# Patient Record
Sex: MALE | Race: WHITE | NOT HISPANIC OR LATINO
[De-identification: names, ages, dates, MRNs, and addresses within clinical notes are randomized per-mention and may not be internally consistent; named-entity substitution may affect disease eponyms.]

---

## 2017-03-15 ENCOUNTER — APPOINTMENT (OUTPATIENT)
Dept: OTOLARYNGOLOGY | Facility: CLINIC | Age: 62
End: 2017-03-15

## 2017-03-17 PROBLEM — Z00.00 ENCOUNTER FOR PREVENTIVE HEALTH EXAMINATION: Status: ACTIVE | Noted: 2017-03-17

## 2017-03-19 ENCOUNTER — TRANSCRIPTION ENCOUNTER (OUTPATIENT)
Age: 62
End: 2017-03-19

## 2017-04-11 ENCOUNTER — OUTPATIENT (OUTPATIENT)
Dept: OUTPATIENT SERVICES | Facility: HOSPITAL | Age: 62
LOS: 1 days | Discharge: HOME | End: 2017-04-11

## 2017-06-16 ENCOUNTER — OUTPATIENT (OUTPATIENT)
Dept: OUTPATIENT SERVICES | Facility: HOSPITAL | Age: 62
LOS: 1 days | Discharge: HOME | End: 2017-06-16

## 2017-06-16 DIAGNOSIS — I10 ESSENTIAL (PRIMARY) HYPERTENSION: ICD-10-CM

## 2017-06-16 DIAGNOSIS — E78.5 HYPERLIPIDEMIA, UNSPECIFIED: ICD-10-CM

## 2017-06-16 DIAGNOSIS — R04.2 HEMOPTYSIS: ICD-10-CM

## 2017-06-28 DIAGNOSIS — I77.810 THORACIC AORTIC ECTASIA: ICD-10-CM

## 2017-07-25 DIAGNOSIS — M54.2 CERVICALGIA: ICD-10-CM

## 2018-07-24 ENCOUNTER — OUTPATIENT (OUTPATIENT)
Dept: OUTPATIENT SERVICES | Facility: HOSPITAL | Age: 63
LOS: 1 days | Discharge: HOME | End: 2018-07-24

## 2018-07-24 DIAGNOSIS — R91.8 OTHER NONSPECIFIC ABNORMAL FINDING OF LUNG FIELD: ICD-10-CM

## 2018-09-26 ENCOUNTER — OUTPATIENT (OUTPATIENT)
Dept: OUTPATIENT SERVICES | Facility: HOSPITAL | Age: 63
LOS: 1 days | Discharge: HOME | End: 2018-09-26

## 2018-09-26 DIAGNOSIS — E78.1 PURE HYPERGLYCERIDEMIA: ICD-10-CM

## 2018-11-29 ENCOUNTER — APPOINTMENT (OUTPATIENT)
Dept: UROLOGY | Facility: CLINIC | Age: 63
End: 2018-11-29
Payer: COMMERCIAL

## 2018-11-29 ENCOUNTER — OUTPATIENT (OUTPATIENT)
Dept: OUTPATIENT SERVICES | Facility: HOSPITAL | Age: 63
LOS: 1 days | Discharge: HOME | End: 2018-11-29

## 2018-11-29 VITALS
SYSTOLIC BLOOD PRESSURE: 124 MMHG | HEIGHT: 71 IN | HEART RATE: 68 BPM | DIASTOLIC BLOOD PRESSURE: 81 MMHG | BODY MASS INDEX: 28.28 KG/M2 | WEIGHT: 202 LBS

## 2018-11-29 DIAGNOSIS — Z84.89 FAMILY HISTORY OF OTHER SPECIFIED CONDITIONS: ICD-10-CM

## 2018-11-29 DIAGNOSIS — I77.819 AORTIC ECTASIA, UNSPECIFIED SITE: ICD-10-CM

## 2018-11-29 DIAGNOSIS — J45.909 UNSPECIFIED ASTHMA, UNCOMPLICATED: ICD-10-CM

## 2018-11-29 DIAGNOSIS — I10 ESSENTIAL (PRIMARY) HYPERTENSION: ICD-10-CM

## 2018-11-29 DIAGNOSIS — Z82.0 FAMILY HISTORY OF EPILEPSY AND OTHER DISEASES OF THE NERVOUS SYSTEM: ICD-10-CM

## 2018-11-29 DIAGNOSIS — Z80.42 FAMILY HISTORY OF MALIGNANT NEOPLASM OF PROSTATE: ICD-10-CM

## 2018-11-29 PROCEDURE — 99204 OFFICE O/P NEW MOD 45 MIN: CPT

## 2018-11-29 RX ORDER — ALBUTEROL SULFATE 90 UG/1
108 (90 BASE) AEROSOL, METERED RESPIRATORY (INHALATION)
Refills: 0 | Status: ACTIVE | COMMUNITY

## 2018-11-29 RX ORDER — GEMFIBROZIL 600 MG/1
600 TABLET, FILM COATED ORAL
Refills: 0 | Status: ACTIVE | COMMUNITY

## 2018-11-29 RX ORDER — FLUTICASONE PROPIONATE AND SALMETEROL 50; 250 UG/1; UG/1
250-50 POWDER RESPIRATORY (INHALATION)
Refills: 0 | Status: ACTIVE | COMMUNITY

## 2018-11-30 DIAGNOSIS — N40.1 BENIGN PROSTATIC HYPERPLASIA WITH LOWER URINARY TRACT SYMPTOMS: ICD-10-CM

## 2018-12-11 ENCOUNTER — OUTPATIENT (OUTPATIENT)
Dept: OUTPATIENT SERVICES | Facility: HOSPITAL | Age: 63
LOS: 1 days | Discharge: HOME | End: 2018-12-11

## 2018-12-11 LAB — BACTERIA UR CULT: NORMAL

## 2018-12-12 DIAGNOSIS — Z13.820 ENCOUNTER FOR SCREENING FOR OSTEOPOROSIS: ICD-10-CM

## 2018-12-12 DIAGNOSIS — Z87.310 PERSONAL HISTORY OF (HEALED) OSTEOPOROSIS FRACTURE: ICD-10-CM

## 2018-12-12 DIAGNOSIS — E21.0 PRIMARY HYPERPARATHYROIDISM: ICD-10-CM

## 2019-02-11 ENCOUNTER — LABORATORY RESULT (OUTPATIENT)
Age: 64
End: 2019-02-11

## 2019-02-11 ENCOUNTER — OUTPATIENT (OUTPATIENT)
Dept: OUTPATIENT SERVICES | Facility: HOSPITAL | Age: 64
LOS: 1 days | Discharge: HOME | End: 2019-02-11

## 2019-02-11 DIAGNOSIS — N40.1 BENIGN PROSTATIC HYPERPLASIA WITH LOWER URINARY TRACT SYMPTOMS: ICD-10-CM

## 2019-02-19 ENCOUNTER — LABORATORY RESULT (OUTPATIENT)
Age: 64
End: 2019-02-19

## 2019-02-19 ENCOUNTER — APPOINTMENT (OUTPATIENT)
Dept: UROLOGY | Facility: CLINIC | Age: 64
End: 2019-02-19
Payer: COMMERCIAL

## 2019-02-19 PROCEDURE — 76872 US TRANSRECTAL: CPT

## 2019-02-19 PROCEDURE — 55700: CPT

## 2019-02-20 ENCOUNTER — OUTPATIENT (OUTPATIENT)
Dept: OUTPATIENT SERVICES | Facility: HOSPITAL | Age: 64
LOS: 1 days | Discharge: HOME | End: 2019-02-20

## 2019-02-21 DIAGNOSIS — R89.7 ABNORMAL HISTOLOGICAL FINDINGS IN SPECIMENS FROM OTHER ORGANS, SYSTEMS AND TISSUES: ICD-10-CM

## 2019-02-27 ENCOUNTER — TRANSCRIPTION ENCOUNTER (OUTPATIENT)
Age: 64
End: 2019-02-27

## 2019-03-08 ENCOUNTER — APPOINTMENT (OUTPATIENT)
Dept: UROLOGY | Facility: CLINIC | Age: 64
End: 2019-03-08
Payer: COMMERCIAL

## 2019-03-08 VITALS
HEIGHT: 71 IN | SYSTOLIC BLOOD PRESSURE: 124 MMHG | HEART RATE: 57 BPM | BODY MASS INDEX: 28.28 KG/M2 | DIASTOLIC BLOOD PRESSURE: 72 MMHG | WEIGHT: 202 LBS

## 2019-03-08 PROCEDURE — 99214 OFFICE O/P EST MOD 30 MIN: CPT

## 2019-03-08 NOTE — HISTORY OF PRESENT ILLNESS
[Urinary Frequency] : urinary frequency [Nocturia] : nocturia [None] : None [FreeTextEntry1] : 63-year-old male he has followup post TRUSBx on February 19, 2019. He denies any postbiopsy febrile events but does report visual blood in his semen last week which has since cleared. His voiding symptoms remain unchanged indicating frequency nocturia x3-4 with a fair urinary stream.\par Of clinical significance is his history of a thoracic aortic dilation approximating 4.4 cm, actively being followed by the pulmonary team.\par Of clinical importance is history of hypercalcemia--actively being followed by  endocrine team.\par Pathology : LLM 4+3 / group 2  /50%\par                  : LLA 4+3  /grioup 2/  70%\par                  : LA 4+5  / group 5 / 90 %\par                  : LM  3+3 / group 1 / 10 %\par 2018 psa =6.3  % free - 12.0 [Weak Stream] : no weak stream [Dysuria] : no dysuria [Hematuria - Gross] : no gross hematuria [Fatigue] : no fatigue

## 2019-03-08 NOTE — PHYSICAL EXAM
[General Appearance - Well Developed] : well developed [General Appearance - Well Nourished] : well nourished [Normal Appearance] : normal appearance [Well Groomed] : well groomed [General Appearance - In No Acute Distress] : no acute distress [Abdomen Soft] : soft [Abdomen Tenderness] : non-tender [Costovertebral Angle Tenderness] : no ~M costovertebral angle tenderness [Urethral Meatus] : meatus normal [Skin Color & Pigmentation] : normal skin color and pigmentation [Edema] : no peripheral edema [] : no respiratory distress [Respiration, Rhythm And Depth] : normal respiratory rhythm and effort [Exaggerated Use Of Accessory Muscles For Inspiration] : no accessory muscle use [Oriented To Time, Place, And Person] : oriented to person, place, and time [Affect] : the affect was normal [Mood] : the mood was normal [Not Anxious] : not anxious [Normal Station and Gait] : the gait and station were normal for the patient's age [No Focal Deficits] : no focal deficits [No Palpable Adenopathy] : no palpable adenopathy [FreeTextEntry1] : global

## 2019-03-08 NOTE — ASSESSMENT
[FreeTextEntry1] : #1. Prostate carcinoma, stage pT2a, group 5 , high risk stratification\par #2. Lower urinary tract symptoms--probably associated with prostate enlargement\par \par Plan\par CT scan of abdomen and pelvis\par Bone scan\par Chest CTs have been recently performed by pulmonary for thoracic aorta evaluation.\par We discussed treatment options such as Robotic radical prostatectomy and radiation therapy. Patient voices his desire for surgical extirpation. Therefore we discussed the procedure, risks, benefits, expectations,, postop course, of a Bard prostatectomy. Video disc given\par Patient to have consultation with Dr. Meyer\par No clear indication for Oncotype  GPS\par

## 2019-03-13 ENCOUNTER — OTHER (OUTPATIENT)
Age: 64
End: 2019-03-13

## 2019-03-15 ENCOUNTER — OUTPATIENT (OUTPATIENT)
Dept: OUTPATIENT SERVICES | Facility: HOSPITAL | Age: 64
LOS: 1 days | Discharge: HOME | End: 2019-03-15

## 2019-03-15 ENCOUNTER — MESSAGE (OUTPATIENT)
Age: 64
End: 2019-03-15

## 2019-03-15 ENCOUNTER — OTHER (OUTPATIENT)
Age: 64
End: 2019-03-15

## 2019-03-15 ENCOUNTER — FORM ENCOUNTER (OUTPATIENT)
Age: 64
End: 2019-03-15

## 2019-03-15 DIAGNOSIS — C61 MALIGNANT NEOPLASM OF PROSTATE: ICD-10-CM

## 2019-03-16 ENCOUNTER — OUTPATIENT (OUTPATIENT)
Dept: OUTPATIENT SERVICES | Facility: HOSPITAL | Age: 64
LOS: 1 days | Discharge: HOME | End: 2019-03-16

## 2019-03-16 DIAGNOSIS — C61 MALIGNANT NEOPLASM OF PROSTATE: ICD-10-CM

## 2019-03-19 ENCOUNTER — OUTPATIENT (OUTPATIENT)
Dept: OUTPATIENT SERVICES | Facility: HOSPITAL | Age: 64
LOS: 1 days | Discharge: HOME | End: 2019-03-19

## 2019-03-19 DIAGNOSIS — C61 MALIGNANT NEOPLASM OF PROSTATE: ICD-10-CM

## 2019-03-20 LAB
BUN SERPL-MCNC: 20 MG/DL
CREAT SERPL-MCNC: 1 MG/DL

## 2019-03-21 ENCOUNTER — APPOINTMENT (OUTPATIENT)
Dept: UROLOGY | Facility: CLINIC | Age: 64
End: 2019-03-21
Payer: COMMERCIAL

## 2019-03-21 VITALS
BODY MASS INDEX: 23.37 KG/M2 | DIASTOLIC BLOOD PRESSURE: 74 MMHG | SYSTOLIC BLOOD PRESSURE: 122 MMHG | HEART RATE: 66 BPM | HEIGHT: 78 IN | WEIGHT: 202 LBS

## 2019-03-21 PROCEDURE — 99215 OFFICE O/P EST HI 40 MIN: CPT

## 2019-03-21 NOTE — PHYSICAL EXAM
[General Appearance - Well Developed] : well developed [General Appearance - Well Nourished] : well nourished [Normal Appearance] : normal appearance [Well Groomed] : well groomed [General Appearance - In No Acute Distress] : no acute distress [Edema] : no peripheral edema [Respiration, Rhythm And Depth] : normal respiratory rhythm and effort [Exaggerated Use Of Accessory Muscles For Inspiration] : no accessory muscle use [Abdomen Soft] : soft [Abdomen Tenderness] : non-tender [Costovertebral Angle Tenderness] : no ~M costovertebral angle tenderness [FreeTextEntry1] : thin, no scars [Urinary Bladder Findings] : the bladder was normal on palpation [Testes Mass (___cm)] : there were no testicular masses [Normal Station and Gait] : the gait and station were normal for the patient's age [] : no rash [No Focal Deficits] : no focal deficits [Oriented To Time, Place, And Person] : oriented to person, place, and time [Affect] : the affect was normal [Mood] : the mood was normal [Not Anxious] : not anxious [No Palpable Adenopathy] : no palpable adenopathy

## 2019-03-21 NOTE — ASSESSMENT
[FreeTextEntry1] : DANILO GREGORY is a 63 year old male who presents with high risk PCa. \par We discussed the various management options and patient elects to go robotic assisted radical prostatectomy. Patient's father had radiation therapy and suffered from radiation side effects and therefore the patient would prefer to undergo surgery.\par I would like to obtain MRI for further assessment of the prostate and presurgical planning.\par We did discuss the nerve sparing a high risk prostate cancer would be significantly risky. We also discussed the likelihood for needing salvage radiation therapy. \par \par Summary of our discussion--\par The natural history of this disease was explained to the patient at length and the treatment options discussed including radical prostatectomy by open or robotic-assisted laparoscopic approach, external-beam radiotherapy, brachytherapy, and watchful waiting, including the probability of success and complications associated with these approaches.\par \par With respect to seed implants, we discussed risks including but not limited to cancer recurrence, exacerbation of voiding symptoms or hematuria, urinary retention, induction of 2nd malignancy, and risks of rectal symptoms or bleeding.  We also discussed risks of the anesthesia including but not limited to MI, CVA, DVT, and PE.  \par With respect to EBRT, we discussed we discussed risks including but not limited to cancer recurrence, exacerbation of voiding symptoms or hematuria, urinary retention, incontinence, stricture of the urinary tract, induction of 2nd malignancy, and risks of rectal symptoms or bleeding.  We discussed fact that rectal symptoms may be more common with EBRT than with other treatment modalities. I did convey that the risk of erectile dysfunction was likely lower with EBRT, at least in the short-term.\par \par With radiation therapy, we discussed the difficulties in diagnosing recurrent disease at an early stage due to variability in PSA levels and the fact that most patients are not candidates for local salvage therapy when biochemical recurrence is declared.  We also discussed the significant morbidity of local salvage therapy in terms of perioperative complications, erectile dysfunction and urinary incontinence.\par \par With respect to radical prostatectomy, the pros and cons of open vs robotic-assisted laparoscopic prostatectomy were discussed. The complications of this procedure were reviewed with the patient and include (but not limited to) urinary incontinence, erectile dysfunction, infertility, anastomotic stricture, lymphocele, hemorrhage requiring transfusion, rectal, ureteral, or nerve injury, infection, cardiovascular, pulmonary, thromboembolic, and anesthetic complications.  For robotic prostatectomy, the additional complications of anastomotic urine leak and small bowel obstruction from adhesions was conveyed. We also discussed the need for a urethral catheter as well as a MOLLY drain post-operatively.\par \par With surgery, we also discussed the potential advantage over radiation therapy in that biochemical recurrence can be detected at a relatively earlier stage and that salvage radiotherapy is successful in controlling recurrent disease in a substantial proportion of patients.  I also conveyed that salvage radiotherapy was associated with a considerably more favorable morbidity profile compared to local salvage therapies for radiorecurrent disease. \par \par With briefly discussed watchful waiting/active surveillance and the difficulties of estimating the extent of disease preoperatively, the risk of cancer progression, and risk that salvage might not be possible with progression. These approaches were not recommended given he has high risk PCa and a high life expectancy.\par \par All of the patient questions were answered.  \par \par \par

## 2019-03-21 NOTE — HISTORY OF PRESENT ILLNESS
[FreeTextEntry1] : DANILO GREGORY is a 63 year old male who presents with high risk PCa. He is a patient of Dr. Funes. Underwent TRUS PBNx 2/19/19 for PSA 6.3,  pathology demonstrating ,Gl 4+5 PCa grade group 5, involving up to 90% of core, 4 of 12 positive. \par He presents today for discussion regarding surgical options.\par Bone scan neg for mets, CT pelvis no lymphad\par WILIAM score 18\par AUA score 19 mostly unsatisfied\par From Nov 2018 however reports worsening LUTS. States that he is having nocturia 3-4 times and daytime frequency and urgency. \par Denies gross hematuria, dysuria or associated symptoms. 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

## 2019-03-22 ENCOUNTER — APPOINTMENT (OUTPATIENT)
Dept: UROLOGY | Facility: CLINIC | Age: 64
End: 2019-03-22
Payer: COMMERCIAL

## 2019-04-03 ENCOUNTER — OUTPATIENT (OUTPATIENT)
Dept: OUTPATIENT SERVICES | Facility: HOSPITAL | Age: 64
LOS: 1 days | Discharge: HOME | End: 2019-04-03
Payer: COMMERCIAL

## 2019-04-03 VITALS
RESPIRATION RATE: 18 BRPM | DIASTOLIC BLOOD PRESSURE: 74 MMHG | OXYGEN SATURATION: 96 % | TEMPERATURE: 98 F | HEIGHT: 71 IN | HEART RATE: 67 BPM | WEIGHT: 205.69 LBS | SYSTOLIC BLOOD PRESSURE: 137 MMHG

## 2019-04-03 DIAGNOSIS — C61 MALIGNANT NEOPLASM OF PROSTATE: ICD-10-CM

## 2019-04-03 DIAGNOSIS — Z98.890 OTHER SPECIFIED POSTPROCEDURAL STATES: Chronic | ICD-10-CM

## 2019-04-03 DIAGNOSIS — Z90.89 ACQUIRED ABSENCE OF OTHER ORGANS: Chronic | ICD-10-CM

## 2019-04-03 DIAGNOSIS — Z01.818 ENCOUNTER FOR OTHER PREPROCEDURAL EXAMINATION: ICD-10-CM

## 2019-04-03 LAB
ALBUMIN SERPL ELPH-MCNC: 5 G/DL — SIGNIFICANT CHANGE UP (ref 3.5–5.2)
ALP SERPL-CCNC: 105 U/L — SIGNIFICANT CHANGE UP (ref 30–115)
ALT FLD-CCNC: 20 U/L — SIGNIFICANT CHANGE UP (ref 0–41)
ANION GAP SERPL CALC-SCNC: 15 MMOL/L — HIGH (ref 7–14)
APPEARANCE UR: CLEAR — SIGNIFICANT CHANGE UP
APTT BLD: 40.5 SEC — HIGH (ref 27–39.2)
AST SERPL-CCNC: 19 U/L — SIGNIFICANT CHANGE UP (ref 0–41)
BASOPHILS # BLD AUTO: 0.08 K/UL — SIGNIFICANT CHANGE UP (ref 0–0.2)
BASOPHILS NFR BLD AUTO: 1.2 % — HIGH (ref 0–1)
BILIRUB SERPL-MCNC: 0.4 MG/DL — SIGNIFICANT CHANGE UP (ref 0.2–1.2)
BILIRUB UR-MCNC: NEGATIVE — SIGNIFICANT CHANGE UP
BLD GP AB SCN SERPL QL: SIGNIFICANT CHANGE UP
BUN SERPL-MCNC: 19 MG/DL — SIGNIFICANT CHANGE UP (ref 10–20)
CALCIUM SERPL-MCNC: 11.2 MG/DL — HIGH (ref 8.5–10.1)
CHLORIDE SERPL-SCNC: 103 MMOL/L — SIGNIFICANT CHANGE UP (ref 98–110)
CO2 SERPL-SCNC: 22 MMOL/L — SIGNIFICANT CHANGE UP (ref 17–32)
COLOR SPEC: YELLOW — SIGNIFICANT CHANGE UP
CREAT SERPL-MCNC: 0.9 MG/DL — SIGNIFICANT CHANGE UP (ref 0.7–1.5)
DIFF PNL FLD: NEGATIVE — SIGNIFICANT CHANGE UP
EOSINOPHIL # BLD AUTO: 0.21 K/UL — SIGNIFICANT CHANGE UP (ref 0–0.7)
EOSINOPHIL NFR BLD AUTO: 3.2 % — SIGNIFICANT CHANGE UP (ref 0–8)
GLUCOSE SERPL-MCNC: 121 MG/DL — HIGH (ref 70–99)
GLUCOSE UR QL: NEGATIVE MG/DL — SIGNIFICANT CHANGE UP
HCT VFR BLD CALC: 43.2 % — SIGNIFICANT CHANGE UP (ref 42–52)
HGB BLD-MCNC: 13.7 G/DL — LOW (ref 14–18)
IMM GRANULOCYTES NFR BLD AUTO: 0.8 % — HIGH (ref 0.1–0.3)
INR BLD: 0.89 RATIO — SIGNIFICANT CHANGE UP (ref 0.65–1.3)
KETONES UR-MCNC: NEGATIVE — SIGNIFICANT CHANGE UP
LEUKOCYTE ESTERASE UR-ACNC: NEGATIVE — SIGNIFICANT CHANGE UP
LYMPHOCYTES # BLD AUTO: 2 K/UL — SIGNIFICANT CHANGE UP (ref 1.2–3.4)
LYMPHOCYTES # BLD AUTO: 30 % — SIGNIFICANT CHANGE UP (ref 20.5–51.1)
MCHC RBC-ENTMCNC: 23.1 PG — LOW (ref 27–31)
MCHC RBC-ENTMCNC: 31.7 G/DL — LOW (ref 32–37)
MCV RBC AUTO: 72.7 FL — LOW (ref 80–94)
MONOCYTES # BLD AUTO: 0.46 K/UL — SIGNIFICANT CHANGE UP (ref 0.1–0.6)
MONOCYTES NFR BLD AUTO: 6.9 % — SIGNIFICANT CHANGE UP (ref 1.7–9.3)
NEUTROPHILS # BLD AUTO: 3.86 K/UL — SIGNIFICANT CHANGE UP (ref 1.4–6.5)
NEUTROPHILS NFR BLD AUTO: 57.9 % — SIGNIFICANT CHANGE UP (ref 42.2–75.2)
NITRITE UR-MCNC: NEGATIVE — SIGNIFICANT CHANGE UP
NRBC # BLD: 0 /100 WBCS — SIGNIFICANT CHANGE UP (ref 0–0)
PH UR: 6 — SIGNIFICANT CHANGE UP (ref 5–8)
PLATELET # BLD AUTO: 281 K/UL — SIGNIFICANT CHANGE UP (ref 130–400)
POTASSIUM SERPL-MCNC: 4.6 MMOL/L — SIGNIFICANT CHANGE UP (ref 3.5–5)
POTASSIUM SERPL-SCNC: 4.6 MMOL/L — SIGNIFICANT CHANGE UP (ref 3.5–5)
PROT SERPL-MCNC: 7.4 G/DL — SIGNIFICANT CHANGE UP (ref 6–8)
PROT UR-MCNC: NEGATIVE MG/DL — SIGNIFICANT CHANGE UP
PROTHROM AB SERPL-ACNC: 10.3 SEC — SIGNIFICANT CHANGE UP (ref 9.95–12.87)
RBC # BLD: 5.94 M/UL — SIGNIFICANT CHANGE UP (ref 4.7–6.1)
RBC # FLD: 14.7 % — HIGH (ref 11.5–14.5)
SODIUM SERPL-SCNC: 140 MMOL/L — SIGNIFICANT CHANGE UP (ref 135–146)
SP GR SPEC: 1.02 — SIGNIFICANT CHANGE UP (ref 1.01–1.03)
TYPE + AB SCN PNL BLD: SIGNIFICANT CHANGE UP
UROBILINOGEN FLD QL: 0.2 MG/DL — SIGNIFICANT CHANGE UP (ref 0.2–0.2)
WBC # BLD: 6.66 K/UL — SIGNIFICANT CHANGE UP (ref 4.8–10.8)
WBC # FLD AUTO: 6.66 K/UL — SIGNIFICANT CHANGE UP (ref 4.8–10.8)

## 2019-04-03 PROCEDURE — 71046 X-RAY EXAM CHEST 2 VIEWS: CPT | Mod: 26

## 2019-04-03 PROCEDURE — 93010 ELECTROCARDIOGRAM REPORT: CPT

## 2019-04-03 NOTE — H&P PST ADULT - NSICDXPASTSURGICALHX_GEN_ALL_CORE_FT
PAST SURGICAL HISTORY:  History of surgery right    History of tonsillectomy PAST SURGICAL HISTORY:  History of surgery right tibia/fibula    History of tonsillectomy

## 2019-04-03 NOTE — H&P PST ADULT - REASON FOR ADMISSION
65 Y/O M SCHEDULED FOR PAST FOR ROBOTIC RADICAL PROSTATECTOMY AND PELVIC LYMPH NODE DISSECTION ON 4/17/19

## 2019-04-03 NOTE — H&P PST ADULT - PRIMARY CARE PROVIDER
strange- lv last week   manniatis- pulm lv 2018     lachey-2018 (dilated aorta) strange- lv last week   manniatis- pulm lv 2018     lachey-2018 (dilated aorta)   anyi-endocrine (elevated calcium)     mostafavi- optho annual visits

## 2019-04-03 NOTE — H&P PST ADULT - BREASTS
not examined Consent (Lip)/Introductory Paragraph: The rationale for Mohs was explained to the patient and consent was obtained. The risks, benefits and alternatives to therapy were discussed in detail. Specifically, the risks of lip deformity, changes in the oral aperture, infection, scarring, bleeding, prolonged wound healing, incomplete removal, allergy to anesthesia, nerve injury and recurrence were addressed. Prior to the procedure, the treatment site was clearly identified and confirmed by the patient. All components of Universal Protocol/PAUSE Rule completed.

## 2019-04-03 NOTE — H&P PST ADULT - NSICDXPASTMEDICALHX_GEN_ALL_CORE_FT
PAST MEDICAL HISTORY:  Asthma last attack 3-4 years ago  last used ventolin 3 weeks ago    Back pain     GERD (gastroesophageal reflux disease)     H/O detached retina repair left eye    High cholesterol     HTN (hypertension)     Prostate cancer     Serum calcium elevated

## 2019-04-05 LAB
CULTURE RESULTS: NO GROWTH — SIGNIFICANT CHANGE UP
SPECIMEN SOURCE: SIGNIFICANT CHANGE UP

## 2019-04-09 ENCOUNTER — OTHER (OUTPATIENT)
Age: 64
End: 2019-04-09

## 2019-04-10 ENCOUNTER — FORM ENCOUNTER (OUTPATIENT)
Age: 64
End: 2019-04-10

## 2019-04-10 PROBLEM — Z98.890 OTHER SPECIFIED POSTPROCEDURAL STATES: Chronic | Status: ACTIVE | Noted: 2019-04-03

## 2019-04-10 PROBLEM — I10 ESSENTIAL (PRIMARY) HYPERTENSION: Chronic | Status: ACTIVE | Noted: 2019-04-03

## 2019-04-10 PROBLEM — E78.00 PURE HYPERCHOLESTEROLEMIA, UNSPECIFIED: Chronic | Status: ACTIVE | Noted: 2019-04-03

## 2019-04-10 PROBLEM — C61 MALIGNANT NEOPLASM OF PROSTATE: Chronic | Status: ACTIVE | Noted: 2019-04-03

## 2019-04-10 PROBLEM — E83.52 HYPERCALCEMIA: Chronic | Status: ACTIVE | Noted: 2019-04-03

## 2019-04-10 PROBLEM — M54.9 DORSALGIA, UNSPECIFIED: Chronic | Status: ACTIVE | Noted: 2019-04-03

## 2019-04-10 PROBLEM — K21.9 GASTRO-ESOPHAGEAL REFLUX DISEASE WITHOUT ESOPHAGITIS: Chronic | Status: ACTIVE | Noted: 2019-04-03

## 2019-04-11 ENCOUNTER — OUTPATIENT (OUTPATIENT)
Dept: OUTPATIENT SERVICES | Facility: HOSPITAL | Age: 64
LOS: 1 days | Discharge: HOME | End: 2019-04-11
Payer: COMMERCIAL

## 2019-04-11 DIAGNOSIS — Z90.89 ACQUIRED ABSENCE OF OTHER ORGANS: Chronic | ICD-10-CM

## 2019-04-11 DIAGNOSIS — Z98.890 OTHER SPECIFIED POSTPROCEDURAL STATES: Chronic | ICD-10-CM

## 2019-04-11 DIAGNOSIS — C61 MALIGNANT NEOPLASM OF PROSTATE: ICD-10-CM

## 2019-04-11 PROCEDURE — 72197 MRI PELVIS W/O & W/DYE: CPT | Mod: 26

## 2019-04-16 DIAGNOSIS — I10 ESSENTIAL (PRIMARY) HYPERTENSION: ICD-10-CM

## 2019-04-16 DIAGNOSIS — K21.9 GASTRO-ESOPHAGEAL REFLUX DISEASE WITHOUT ESOPHAGITIS: ICD-10-CM

## 2019-04-16 DIAGNOSIS — J45.909 UNSPECIFIED ASTHMA, UNCOMPLICATED: ICD-10-CM

## 2019-04-17 ENCOUNTER — APPOINTMENT (OUTPATIENT)
Dept: UROLOGY | Facility: HOSPITAL | Age: 64
End: 2019-04-17
Payer: COMMERCIAL

## 2019-04-17 ENCOUNTER — INPATIENT (INPATIENT)
Facility: HOSPITAL | Age: 64
LOS: 1 days | Discharge: HOME | End: 2019-04-19
Attending: UROLOGY | Admitting: UROLOGY
Payer: COMMERCIAL

## 2019-04-17 ENCOUNTER — RESULT REVIEW (OUTPATIENT)
Age: 64
End: 2019-04-17

## 2019-04-17 VITALS
TEMPERATURE: 98 F | HEIGHT: 71 IN | SYSTOLIC BLOOD PRESSURE: 137 MMHG | HEART RATE: 58 BPM | RESPIRATION RATE: 20 BRPM | WEIGHT: 201.94 LBS | DIASTOLIC BLOOD PRESSURE: 90 MMHG

## 2019-04-17 DIAGNOSIS — Z98.890 OTHER SPECIFIED POSTPROCEDURAL STATES: Chronic | ICD-10-CM

## 2019-04-17 DIAGNOSIS — Z90.89 ACQUIRED ABSENCE OF OTHER ORGANS: Chronic | ICD-10-CM

## 2019-04-17 DIAGNOSIS — C61 MALIGNANT NEOPLASM OF PROSTATE: ICD-10-CM

## 2019-04-17 LAB
ABO RH CONFIRMATION: SIGNIFICANT CHANGE UP
ANION GAP SERPL CALC-SCNC: 13 MMOL/L — SIGNIFICANT CHANGE UP (ref 7–14)
ANISOCYTOSIS BLD QL: SLIGHT — SIGNIFICANT CHANGE UP
BASOPHILS # BLD AUTO: 0 K/UL — SIGNIFICANT CHANGE UP (ref 0–0.2)
BASOPHILS NFR BLD AUTO: 0 % — SIGNIFICANT CHANGE UP (ref 0–1)
BUN SERPL-MCNC: 20 MG/DL — SIGNIFICANT CHANGE UP (ref 10–20)
CALCIUM SERPL-MCNC: 9.8 MG/DL — SIGNIFICANT CHANGE UP (ref 8.5–10.1)
CHLORIDE SERPL-SCNC: 104 MMOL/L — SIGNIFICANT CHANGE UP (ref 98–110)
CO2 SERPL-SCNC: 22 MMOL/L — SIGNIFICANT CHANGE UP (ref 17–32)
CREAT SERPL-MCNC: 1.2 MG/DL — SIGNIFICANT CHANGE UP (ref 0.7–1.5)
EOSINOPHIL # BLD AUTO: 0 K/UL — SIGNIFICANT CHANGE UP (ref 0–0.7)
EOSINOPHIL NFR BLD AUTO: 0 % — SIGNIFICANT CHANGE UP (ref 0–8)
GIANT PLATELETS BLD QL SMEAR: PRESENT — SIGNIFICANT CHANGE UP
GLUCOSE SERPL-MCNC: 142 MG/DL — HIGH (ref 70–99)
HCT VFR BLD CALC: 37.9 % — LOW (ref 42–52)
HGB BLD-MCNC: 12.1 G/DL — LOW (ref 14–18)
LYMPHOCYTES # BLD AUTO: 0 % — LOW (ref 20.5–51.1)
LYMPHOCYTES # BLD AUTO: 0 K/UL — LOW (ref 1.2–3.4)
MANUAL SMEAR VERIFICATION: SIGNIFICANT CHANGE UP
MCHC RBC-ENTMCNC: 23 PG — LOW (ref 27–31)
MCHC RBC-ENTMCNC: 31.9 G/DL — LOW (ref 32–37)
MCV RBC AUTO: 72.1 FL — LOW (ref 80–94)
MICROCYTES BLD QL: SLIGHT — SIGNIFICANT CHANGE UP
MONOCYTES # BLD AUTO: 0.36 K/UL — SIGNIFICANT CHANGE UP (ref 0.1–0.6)
MONOCYTES NFR BLD AUTO: 1.8 % — SIGNIFICANT CHANGE UP (ref 1.7–9.3)
NEUTROPHILS # BLD AUTO: 19.32 K/UL — HIGH (ref 1.4–6.5)
NEUTROPHILS NFR BLD AUTO: 96.5 % — HIGH (ref 42.2–75.2)
PLAT MORPH BLD: NORMAL — SIGNIFICANT CHANGE UP
PLATELET # BLD AUTO: 280 K/UL — SIGNIFICANT CHANGE UP (ref 130–400)
POTASSIUM SERPL-MCNC: 4.4 MMOL/L — SIGNIFICANT CHANGE UP (ref 3.5–5)
POTASSIUM SERPL-SCNC: 4.4 MMOL/L — SIGNIFICANT CHANGE UP (ref 3.5–5)
RBC # BLD: 5.26 M/UL — SIGNIFICANT CHANGE UP (ref 4.7–6.1)
RBC # FLD: 14.3 % — SIGNIFICANT CHANGE UP (ref 11.5–14.5)
RBC BLD AUTO: NORMAL — SIGNIFICANT CHANGE UP
SODIUM SERPL-SCNC: 139 MMOL/L — SIGNIFICANT CHANGE UP (ref 135–146)
VARIANT LYMPHS # BLD: 1.7 % — SIGNIFICANT CHANGE UP (ref 0–5)
WBC # BLD: 20.02 K/UL — HIGH (ref 4.8–10.8)
WBC # FLD AUTO: 20.02 K/UL — HIGH (ref 4.8–10.8)

## 2019-04-17 PROCEDURE — 88307 TISSUE EXAM BY PATHOLOGIST: CPT | Mod: 26

## 2019-04-17 PROCEDURE — S2900 ROBOTIC SURGICAL SYSTEM: CPT | Mod: NC

## 2019-04-17 PROCEDURE — 93010 ELECTROCARDIOGRAM REPORT: CPT

## 2019-04-17 PROCEDURE — 38571 LAPAROSCOPY LYMPHADENECTOMY: CPT

## 2019-04-17 PROCEDURE — 88309 TISSUE EXAM BY PATHOLOGIST: CPT | Mod: 26

## 2019-04-17 PROCEDURE — 55866 LAPS SURG PRST8ECT RPBIC RAD: CPT

## 2019-04-17 RX ORDER — MEPERIDINE HYDROCHLORIDE 50 MG/ML
12.5 INJECTION INTRAMUSCULAR; INTRAVENOUS; SUBCUTANEOUS
Qty: 0 | Refills: 0 | Status: DISCONTINUED | OUTPATIENT
Start: 2019-04-17 | End: 2019-04-17

## 2019-04-17 RX ORDER — OXYCODONE AND ACETAMINOPHEN 5; 325 MG/1; MG/1
1 TABLET ORAL ONCE
Qty: 0 | Refills: 0 | Status: DISCONTINUED | OUTPATIENT
Start: 2019-04-17 | End: 2019-04-17

## 2019-04-17 RX ORDER — ACETAMINOPHEN 500 MG
1000 TABLET ORAL EVERY 6 HOURS
Qty: 0 | Refills: 0 | Status: DISCONTINUED | OUTPATIENT
Start: 2019-04-17 | End: 2019-04-19

## 2019-04-17 RX ORDER — CEFAZOLIN SODIUM 1 G
1000 VIAL (EA) INJECTION EVERY 8 HOURS
Qty: 0 | Refills: 0 | Status: COMPLETED | OUTPATIENT
Start: 2019-04-17 | End: 2019-04-18

## 2019-04-17 RX ORDER — DOCUSATE SODIUM 100 MG
100 CAPSULE ORAL THREE TIMES A DAY
Qty: 0 | Refills: 0 | Status: DISCONTINUED | OUTPATIENT
Start: 2019-04-17 | End: 2019-04-19

## 2019-04-17 RX ORDER — SODIUM CHLORIDE 9 MG/ML
1000 INJECTION, SOLUTION INTRAVENOUS
Qty: 0 | Refills: 0 | Status: DISCONTINUED | OUTPATIENT
Start: 2019-04-17 | End: 2019-04-17

## 2019-04-17 RX ORDER — KETOROLAC TROMETHAMINE 30 MG/ML
15 SYRINGE (ML) INJECTION EVERY 6 HOURS
Qty: 0 | Refills: 0 | Status: DISCONTINUED | OUTPATIENT
Start: 2019-04-17 | End: 2019-04-19

## 2019-04-17 RX ORDER — METOCLOPRAMIDE HCL 10 MG
10 TABLET ORAL ONCE
Qty: 0 | Refills: 0 | Status: DISCONTINUED | OUTPATIENT
Start: 2019-04-17 | End: 2019-04-17

## 2019-04-17 RX ORDER — ONDANSETRON 8 MG/1
4 TABLET, FILM COATED ORAL EVERY 6 HOURS
Qty: 0 | Refills: 0 | Status: DISCONTINUED | OUTPATIENT
Start: 2019-04-17 | End: 2019-04-19

## 2019-04-17 RX ORDER — OXYCODONE HYDROCHLORIDE 5 MG/1
5 TABLET ORAL EVERY 6 HOURS
Qty: 0 | Refills: 0 | Status: DISCONTINUED | OUTPATIENT
Start: 2019-04-17 | End: 2019-04-19

## 2019-04-17 RX ORDER — GEMFIBROZIL 600 MG
600 TABLET ORAL
Qty: 0 | Refills: 0 | Status: DISCONTINUED | OUTPATIENT
Start: 2019-04-17 | End: 2019-04-19

## 2019-04-17 RX ORDER — MORPHINE SULFATE 50 MG/1
2 CAPSULE, EXTENDED RELEASE ORAL
Qty: 0 | Refills: 0 | Status: DISCONTINUED | OUTPATIENT
Start: 2019-04-17 | End: 2019-04-17

## 2019-04-17 RX ORDER — ONDANSETRON 8 MG/1
4 TABLET, FILM COATED ORAL ONCE
Qty: 0 | Refills: 0 | Status: DISCONTINUED | OUTPATIENT
Start: 2019-04-17 | End: 2019-04-17

## 2019-04-17 RX ORDER — HEPARIN SODIUM 5000 [USP'U]/ML
5000 INJECTION INTRAVENOUS; SUBCUTANEOUS EVERY 12 HOURS
Qty: 0 | Refills: 0 | Status: DISCONTINUED | OUTPATIENT
Start: 2019-04-17 | End: 2019-04-19

## 2019-04-17 RX ORDER — SODIUM CHLORIDE 9 MG/ML
1000 INJECTION INTRAMUSCULAR; INTRAVENOUS; SUBCUTANEOUS
Qty: 0 | Refills: 0 | Status: DISCONTINUED | OUTPATIENT
Start: 2019-04-17 | End: 2019-04-18

## 2019-04-17 RX ORDER — SENNA PLUS 8.6 MG/1
2 TABLET ORAL AT BEDTIME
Qty: 0 | Refills: 0 | Status: DISCONTINUED | OUTPATIENT
Start: 2019-04-17 | End: 2019-04-19

## 2019-04-17 RX ORDER — LISINOPRIL 2.5 MG/1
20 TABLET ORAL DAILY
Qty: 0 | Refills: 0 | Status: DISCONTINUED | OUTPATIENT
Start: 2019-04-17 | End: 2019-04-19

## 2019-04-17 RX ORDER — HYDROMORPHONE HYDROCHLORIDE 2 MG/ML
0.5 INJECTION INTRAMUSCULAR; INTRAVENOUS; SUBCUTANEOUS
Qty: 0 | Refills: 0 | Status: DISCONTINUED | OUTPATIENT
Start: 2019-04-17 | End: 2019-04-17

## 2019-04-17 RX ORDER — PANTOPRAZOLE SODIUM 20 MG/1
40 TABLET, DELAYED RELEASE ORAL
Qty: 0 | Refills: 0 | Status: DISCONTINUED | OUTPATIENT
Start: 2019-04-17 | End: 2019-04-19

## 2019-04-17 RX ADMIN — Medication 600 MILLIGRAM(S): at 18:20

## 2019-04-17 RX ADMIN — SENNA PLUS 2 TABLET(S): 8.6 TABLET ORAL at 21:52

## 2019-04-17 RX ADMIN — Medication 15 MILLIGRAM(S): at 20:27

## 2019-04-17 RX ADMIN — Medication 1000 MILLIGRAM(S): at 18:29

## 2019-04-17 RX ADMIN — HYDROMORPHONE HYDROCHLORIDE 0.5 MILLIGRAM(S): 2 INJECTION INTRAMUSCULAR; INTRAVENOUS; SUBCUTANEOUS at 14:15

## 2019-04-17 RX ADMIN — HYDROMORPHONE HYDROCHLORIDE 0.5 MILLIGRAM(S): 2 INJECTION INTRAMUSCULAR; INTRAVENOUS; SUBCUTANEOUS at 14:05

## 2019-04-17 RX ADMIN — Medication 100 MILLIGRAM(S): at 21:48

## 2019-04-17 RX ADMIN — HYDROMORPHONE HYDROCHLORIDE 0.5 MILLIGRAM(S): 2 INJECTION INTRAMUSCULAR; INTRAVENOUS; SUBCUTANEOUS at 15:37

## 2019-04-17 RX ADMIN — SODIUM CHLORIDE 125 MILLILITER(S): 9 INJECTION INTRAMUSCULAR; INTRAVENOUS; SUBCUTANEOUS at 13:32

## 2019-04-17 RX ADMIN — HYDROMORPHONE HYDROCHLORIDE 0.5 MILLIGRAM(S): 2 INJECTION INTRAMUSCULAR; INTRAVENOUS; SUBCUTANEOUS at 13:50

## 2019-04-17 RX ADMIN — Medication 15 MILLIGRAM(S): at 20:24

## 2019-04-17 RX ADMIN — HYDROMORPHONE HYDROCHLORIDE 0.5 MILLIGRAM(S): 2 INJECTION INTRAMUSCULAR; INTRAVENOUS; SUBCUTANEOUS at 14:35

## 2019-04-17 NOTE — BRIEF OPERATIVE NOTE - NSICDXBRIEFPROCEDURE_GEN_ALL_CORE_FT
PROCEDURES:  Robotic-assisted prostatectomy with pelvic lymph node dissection 17-Apr-2019 12:55:10  Ward Meyer

## 2019-04-17 NOTE — PATIENT PROFILE ADULT - NSPROHMSYMPCOND_GEN_A_NUR
H/O detached retina repair  Prostate cancer  Serum calcium elevated  Back pain  GERD (gastroesophageal reflux disease)  High cholesterol  HTN (hypertension)  Asthma

## 2019-04-17 NOTE — PROGRESS NOTE ADULT - ASSESSMENT
63 y/o Male s/p robotic assisted radical prostatectomy with extensive node dissection.  Pt doing well, c/o mild soreness and back pain. No n,v, slightly tachycardic.    A) stable POD # 0   s/p robotic assisted radical prostatectomy with extensive node dissection.  tachycardia most likely 2/2 pain    P) Toradol 15 mg q 6 hrs  heparin sq 5000units q 12  SCD's   incentive spirometry  anticipated d/c in AM

## 2019-04-17 NOTE — PROGRESS NOTE ADULT - ASSESSMENT
I agree with the patient's H and P. There have been no changes. [place] computer glitch is not allowing me to sign h and p

## 2019-04-17 NOTE — PROGRESS NOTE ADULT - SUBJECTIVE AND OBJECTIVE BOX
Progress Note  POD # 0    Subjective  63 y/o Male s/p robotic assisted radical prostatectomy with extensive node dissection.  Pt doing well, c/o mild soreness and back pain. No n,v, slightly tachycardic.    [ x ] a 10 point review of systems was negative except where noted    [  ]  Due to altered mental status/intubation, subjective information was not able t be obtained from the patient.  History was obtained, to the extent possible, from review of the chart and collateral sources of information    Vital signs  T(C): , Max: 36.7 (04-17-19 @ 14:25)  HR: 105 (04-17-19 @ 17:00)  BP: 152/94 (04-17-19 @ 17:00)  SpO2: 97% (04-17-19 @ 16:25)    Gen in Mild discomfort  Abd incision clean dry intact mild mehnaz incisional tenderness   Vargas draining well clear yellow urine  MOLLY with min serosanguinous fluid     Labs                        12.1   20.02 )-----------( 280      ( 17 Apr 2019 13:15 )             37.9     17 Apr 2019 13:15    139    |  104    |  20     ----------------------------<  142    4.4     |  22     |  1.2      Ca    9.8        17 Apr 2019 13:15

## 2019-04-17 NOTE — BRIEF OPERATIVE NOTE - OPERATION/FINDINGS
prostate successfully extirpated w grossly negative margins, one suspicious pelvic lymph node sent separately, remainder sent as pelvic lymph nodes

## 2019-04-17 NOTE — ASU DISCHARGE PLAN (ADULT/PEDIATRIC) - CARE PROVIDER_API CALL
Devora Funez)  Urology  [address] [place] [address], [address]  [place] [address]  Phone: [phone]  Fax: [phone]  Follow Up Time:

## 2019-04-17 NOTE — ASU PREOP CHECKLIST - 1.
Due to Dr. Meyer having technical issues, attestation was done in "Progress Note Adult Urology" 4/17/19

## 2019-04-17 NOTE — ASU PATIENT PROFILE, ADULT - PMH
Asthma  last attack 3-4 years ago  last used ventolin 3 weeks ago  Back pain    GERD (gastroesophageal reflux disease)    H/O detached retina repair  left eye  High cholesterol    HTN (hypertension)    Prostate cancer    Serum calcium elevated

## 2019-04-17 NOTE — CHART NOTE - NSCHARTNOTEFT_GEN_A_CORE
PACU ANESTHESIA ADMISSION NOTE      Procedure: Robotic-assisted prostatectomy with pelvic lymph node dissection    Post op diagnosis:  Prostate cancer      ____  Intubated  TV:______       Rate: ______      FiO2: ______    __x__  Patent Airway    _x___  Full return of protective reflexes    _x___  Full recovery from anesthesia / back to baseline     Vitals:   T:     97.7F      R:      12            BP:      119/80            Sat:     100              P: 73      Mental Status:  __x__ Awake   ___x__ Alert   _____ Drowsy   _____ Sedated    Nausea/Vomiting:  __x__ NO  ______Yes,   See Post - Op Orders          Pain Scale (0-10):  __0/10___    Treatment: ____ None    __x__ See Post - Op/PCA Orders    Post - Operative Fluids:   ____ Oral   __x__ See Post - Op Orders    Plan: Discharge:   ____Home       __x___Floor     _____Critical Care    _____  Other:_________________    Comments: pt tolerated procedure well, no anesthesia related complications. Care of pt endorsed to PACU, report given to PACU RN.

## 2019-04-18 ENCOUNTER — TRANSCRIPTION ENCOUNTER (OUTPATIENT)
Age: 64
End: 2019-04-18

## 2019-04-18 LAB
ANION GAP SERPL CALC-SCNC: 12 MMOL/L — SIGNIFICANT CHANGE UP (ref 7–14)
BASOPHILS # BLD AUTO: 0.04 K/UL — SIGNIFICANT CHANGE UP (ref 0–0.2)
BASOPHILS NFR BLD AUTO: 0.4 % — SIGNIFICANT CHANGE UP (ref 0–1)
BUN SERPL-MCNC: 20 MG/DL — SIGNIFICANT CHANGE UP (ref 10–20)
CALCIUM SERPL-MCNC: 9.3 MG/DL — SIGNIFICANT CHANGE UP (ref 8.5–10.1)
CHLORIDE SERPL-SCNC: 105 MMOL/L — SIGNIFICANT CHANGE UP (ref 98–110)
CO2 SERPL-SCNC: 22 MMOL/L — SIGNIFICANT CHANGE UP (ref 17–32)
CREAT SERPL-MCNC: 1.1 MG/DL — SIGNIFICANT CHANGE UP (ref 0.7–1.5)
CREAT SERPL-MCNC: 1.1 MG/DL — SIGNIFICANT CHANGE UP (ref 0.7–1.5)
EOSINOPHIL # BLD AUTO: 0.03 K/UL — SIGNIFICANT CHANGE UP (ref 0–0.7)
EOSINOPHIL NFR BLD AUTO: 0.3 % — SIGNIFICANT CHANGE UP (ref 0–8)
GLUCOSE SERPL-MCNC: 119 MG/DL — HIGH (ref 70–99)
HCT VFR BLD CALC: 32.3 % — LOW (ref 42–52)
HGB BLD-MCNC: 10.4 G/DL — LOW (ref 14–18)
IMM GRANULOCYTES NFR BLD AUTO: 0.4 % — HIGH (ref 0.1–0.3)
LYMPHOCYTES # BLD AUTO: 1.96 K/UL — SIGNIFICANT CHANGE UP (ref 1.2–3.4)
LYMPHOCYTES # BLD AUTO: 20.2 % — LOW (ref 20.5–51.1)
MCHC RBC-ENTMCNC: 23.4 PG — LOW (ref 27–31)
MCHC RBC-ENTMCNC: 32.2 G/DL — SIGNIFICANT CHANGE UP (ref 32–37)
MCV RBC AUTO: 72.6 FL — LOW (ref 80–94)
MONOCYTES # BLD AUTO: 0.75 K/UL — HIGH (ref 0.1–0.6)
MONOCYTES NFR BLD AUTO: 7.7 % — SIGNIFICANT CHANGE UP (ref 1.7–9.3)
NEUTROPHILS # BLD AUTO: 6.86 K/UL — HIGH (ref 1.4–6.5)
NEUTROPHILS NFR BLD AUTO: 71 % — SIGNIFICANT CHANGE UP (ref 42.2–75.2)
NRBC # BLD: 0 /100 WBCS — SIGNIFICANT CHANGE UP (ref 0–0)
PLATELET # BLD AUTO: 232 K/UL — SIGNIFICANT CHANGE UP (ref 130–400)
POTASSIUM SERPL-MCNC: 4.4 MMOL/L — SIGNIFICANT CHANGE UP (ref 3.5–5)
POTASSIUM SERPL-SCNC: 4.4 MMOL/L — SIGNIFICANT CHANGE UP (ref 3.5–5)
RBC # BLD: 4.45 M/UL — LOW (ref 4.7–6.1)
RBC # FLD: 14.5 % — SIGNIFICANT CHANGE UP (ref 11.5–14.5)
SODIUM SERPL-SCNC: 139 MMOL/L — SIGNIFICANT CHANGE UP (ref 135–146)
WBC # BLD: 9.68 K/UL — SIGNIFICANT CHANGE UP (ref 4.8–10.8)
WBC # FLD AUTO: 9.68 K/UL — SIGNIFICANT CHANGE UP (ref 4.8–10.8)

## 2019-04-18 PROCEDURE — 93010 ELECTROCARDIOGRAM REPORT: CPT

## 2019-04-18 PROCEDURE — 99222 1ST HOSP IP/OBS MODERATE 55: CPT

## 2019-04-18 RX ORDER — POLYETHYLENE GLYCOL 3350 17 G/17G
17 POWDER, FOR SOLUTION ORAL ONCE
Qty: 0 | Refills: 0 | Status: COMPLETED | OUTPATIENT
Start: 2019-04-18 | End: 2019-04-18

## 2019-04-18 RX ADMIN — Medication 1000 MILLIGRAM(S): at 18:46

## 2019-04-18 RX ADMIN — Medication 1000 MILLIGRAM(S): at 11:55

## 2019-04-18 RX ADMIN — Medication 15 MILLIGRAM(S): at 13:52

## 2019-04-18 RX ADMIN — Medication 15 MILLIGRAM(S): at 09:10

## 2019-04-18 RX ADMIN — Medication 1000 MILLIGRAM(S): at 11:53

## 2019-04-18 RX ADMIN — Medication 100 MILLIGRAM(S): at 06:04

## 2019-04-18 RX ADMIN — Medication 600 MILLIGRAM(S): at 06:04

## 2019-04-18 RX ADMIN — Medication 15 MILLIGRAM(S): at 02:53

## 2019-04-18 RX ADMIN — Medication 100 MILLIGRAM(S): at 13:52

## 2019-04-18 RX ADMIN — Medication 1000 MILLIGRAM(S): at 18:45

## 2019-04-18 RX ADMIN — HEPARIN SODIUM 5000 UNIT(S): 5000 INJECTION INTRAVENOUS; SUBCUTANEOUS at 18:45

## 2019-04-18 RX ADMIN — Medication 15 MILLIGRAM(S): at 08:02

## 2019-04-18 RX ADMIN — SENNA PLUS 2 TABLET(S): 8.6 TABLET ORAL at 21:40

## 2019-04-18 RX ADMIN — Medication 1000 MILLIGRAM(S): at 01:13

## 2019-04-18 RX ADMIN — Medication 15 MILLIGRAM(S): at 23:36

## 2019-04-18 RX ADMIN — Medication 600 MILLIGRAM(S): at 18:45

## 2019-04-18 RX ADMIN — Medication 1000 MILLIGRAM(S): at 23:54

## 2019-04-18 RX ADMIN — Medication 15 MILLIGRAM(S): at 21:41

## 2019-04-18 RX ADMIN — SODIUM CHLORIDE 1000 MILLILITER(S): 9 INJECTION INTRAMUSCULAR; INTRAVENOUS; SUBCUTANEOUS at 00:11

## 2019-04-18 RX ADMIN — LISINOPRIL 20 MILLIGRAM(S): 2.5 TABLET ORAL at 09:40

## 2019-04-18 RX ADMIN — POLYETHYLENE GLYCOL 3350 17 GRAM(S): 17 POWDER, FOR SOLUTION ORAL at 21:47

## 2019-04-18 RX ADMIN — HEPARIN SODIUM 5000 UNIT(S): 5000 INJECTION INTRAVENOUS; SUBCUTANEOUS at 06:04

## 2019-04-18 RX ADMIN — Medication 1000 MILLIGRAM(S): at 06:04

## 2019-04-18 RX ADMIN — Medication 100 MILLIGRAM(S): at 21:40

## 2019-04-18 RX ADMIN — PANTOPRAZOLE SODIUM 40 MILLIGRAM(S): 20 TABLET, DELAYED RELEASE ORAL at 06:06

## 2019-04-18 RX ADMIN — Medication 15 MILLIGRAM(S): at 13:53

## 2019-04-18 NOTE — DISCHARGE NOTE PROVIDER - NSDCCPTREATMENT_GEN_ALL_CORE_FT
PRINCIPAL PROCEDURE  Procedure: Robotic-assisted prostatectomy with pelvic lymph node dissection  Findings and Treatment:

## 2019-04-18 NOTE — DISCHARGE NOTE PROVIDER - CARE PROVIDER_API CALL
Ward Meyer)  [facility]  [address] [place] [address], [address]  [place] [address]  Phone: [phone]  Fax: [phone]  Follow Up Time:

## 2019-04-18 NOTE — DISCHARGE NOTE PROVIDER - HOSPITAL COURSE
pt is s/p robotic assisted radical prostatectomy with pelvic lymph node dissection POD #1. Pt had uncomplicated surgery. Post op did well and had some post op tachycardia which resolved after adequate hydration. Pt feeling well and is set to be d/c home with boyer catheter. pt is s/p robotic assisted radical prostatectomy with pelvic lymph node dissection POD # 2. Pt had uncomplicated surgery. Post op did well and had some post op tachycardia which resolved after adequate hydration. Pt feeling well and is set to be d/c home with boyer catheter.

## 2019-04-18 NOTE — PROGRESS NOTE ADULT - ASSESSMENT
65 y/o Male s/p robotic assisted radical prostatectomy with extensive node dissection.  Pt doing well, c/o mild soreness and back pain. Events of last pm noted. Pt doing better since fluid  bolus. States that he has not walked since last pm.      A) stable POD # 1   s/p robotic assisted radical prostatectomy with extensive node dissection.  resolved tachycardia 2/2 dehydration    P) advance diet  OOB ambulating  Check labs   incentive spirometry  will d/w attending 63 y/o Male s/p robotic assisted radical prostatectomy with extensive node dissection.  Pt doing well, c/o mild soreness and back pain. Events of last pm noted. Pt doing better since fluid  bolus. States that he has not walked since last pm.  Denies chest pain, SOB, or dizziness.      A) stable POD # 1   s/p robotic assisted radical prostatectomy with extensive node dissection.  resolved tachycardia 2/2 dehydration    P) advance diet  OOB ambulating  Check labs   incentive spirometry  will d/w attending

## 2019-04-18 NOTE — DISCHARGE NOTE PROVIDER - NSDCACTIVITY_GEN_ALL_CORE
Walking - Indoors allowed/No heavy lifting/straining/Walking - Outdoors allowed/Stairs allowed/Showering allowed

## 2019-04-18 NOTE — PROGRESS NOTE ADULT - SUBJECTIVE AND OBJECTIVE BOX
Progress Note  POD # 1    Subjective 65 y/o Male s/p robotic assisted radical prostatectomy with extensive node dissection.  Pt doing well, c/o mild soreness and back pain. Events of last pm noted. Pt doing better since fluid  bolus. States that he has not walked since last pm.      [ x ] a 10 point review of systems was negative except where noted    [  ]  Due to altered mental status/intubation, subjective information was not able t be obtained from the patient.  History was obtained, to the extent possible, from review of the chart and collateral sources of information      Vital signs  T(C): , Max: 36.7 (04-17-19 @ 14:25)  HR: 69 (04-18-19 @ 05:16)  BP: 105/56 (04-18-19 @ 05:16)  SpO2: 97% (04-17-19 @ 16:25)    Gen lying in bed in NAD  Abd soft with mehnaz incisional tenderness  MOLLY draining serosanguinous fluid 153 cc last shift, 313 since surgery   Vargas draining well 2100 last shift, 2400 since surgery      Labs                        12.1   20.02 )-----------( 280      ( 17 Apr 2019 13:15 )             37.9     17 Apr 2019 13:15    139    |  104    |  20     ----------------------------<  142    4.4     |  22     |  1.2      Ca    9.8        17 Apr 2019 13:15  Progress Note  POD # 1    Subjective 63 y/o Male s/p robotic assisted radical prostatectomy with extensive node dissection.  Pt doing well, c/o mild soreness and back pain. Events of last pm noted. Pt doing better since fluid  bolus. States that he has not walked since last pm. Denies chest pain, SOB, or dizziness.      [ x ] a 10 point review of systems was negative except where noted    [  ]  Due to altered mental status/intubation, subjective information was not able t be obtained from the patient.  History was obtained, to the extent possible, from review of the chart and collateral sources of information      Vital signs  T(C): , Max: 36.7 (04-17-19 @ 14:25)  HR: 69 (04-18-19 @ 05:16)  BP: 105/56 (04-18-19 @ 05:16)  SpO2: 97% (04-17-19 @ 16:25)    Gen lying in bed in NAD  Abd soft with mehnaz incisional tenderness  MOLLY draining serosanguinous fluid 153 cc last shift, 313 since surgery   Vargas draining well 2100 last shift, 2400 since surgery      Labs                        12.1   20.02 )-----------( 280      ( 17 Apr 2019 13:15 )             37.9     17 Apr 2019 13:15    139    |  104    |  20     ----------------------------<  142    4.4     |  22     |  1.2      Ca    9.8        17 Apr 2019 13:15

## 2019-04-18 NOTE — DISCHARGE NOTE PROVIDER - CARE PROVIDERS DIRECT ADDRESSES
Anna has history of pressure ulcers and has been using barrier creams.     I have not seen her for this in quite some time but does indeed have history of this in the past and has had increase in impaired mobitility since last seen which increases likelihood of pressure sores.       Dx: L89.309   ,DirectAddress_Unknown

## 2019-04-18 NOTE — CONSULT NOTE ADULT - ASSESSMENT
-s/p robotic prostatectomy, no major events  -Asymptomatic sinus tachycardia, hemodynamically stable likely related to underlying dehydration and stress post op  -mild increase in creatinine     Plan:  Adequate IV hydration , additional one liter NSS bolus and continue IV drip  upgrade po intake when possible as by surgical team   continue lisinopril , maintain BP controlled  resume asa when bleeding risk permit  monitor kidney function and maintain electrolytes within normal ranges   avoid NSAIDS if kidney function worsen

## 2019-04-18 NOTE — CONSULT NOTE ADULT - SUBJECTIVE AND OBJECTIVE BOX
HISTORY OF PRESENT ILLNESS:   64 years old male patient with PMHx of stable aortic aneurysm (4.4 cm with regular follow up on yearly basis with CT chest), HTN (controlled), DL   s/p robotic assisted radical prostatectomy with extensive node dissection on 4/17 with major events per op. Patient admitted for surgical floor for further care. Cardiology team consulted for tachycardia found while patient was walking.   Patient seen at bedside, looks comfortable, denies chest pain, sob or palpitations, no dizziness or syncope. He has been NPO ~ 24 hrs in total. currently on liquid diet.  EKG showed sinus rhythm with non specific changes chronic when compared to EKG done on 2015.   Bedside limited echo showed normal EF with hyperdynamic LV. on physical exam patient is dehydrated.   Hemodynamically stable     PAST MEDICAL & SURGICAL HISTORY:  H/O detached retina repair: left eye  Prostate cancer  Serum calcium elevated  Back pain  GERD (gastroesophageal reflux disease)  High cholesterol  HTN (hypertension)  Asthma: last attack 3-4 years ago  last used ventolin 3 weeks ago  History of surgery: right tibia/fibula  History of tonsillectomy    FAMILY HISTORY:  FH: asthma  FH: Parkinson's disease  FH: brain tumor  FH: prostate cancer    Allergies    No Known Allergies    Intolerances    	  Home Medications:  Advair Diskus 250 mcg-50 mcg inhalation powder: used during high pollen seasons (17 Apr 2019 06:22)  aspirin 81 mg oral tablet, chewable: 1 tab(s) orally once a day (17 Apr 2019 06:22)  gemfibrozil 600 mg oral tablet: 1 tab(s) orally 2 times a day (17 Apr 2019 06:22)  lisinopril 20 mg oral tablet: 1 tab(s) orally once a day (17 Apr 2019 06:22)  Ventolin HFA 90 mcg/inh inhalation aerosol:  (17 Apr 2019 06:22)    MEDICATIONS  (STANDING):  acetaminophen   Tablet .. 1000 milliGRAM(s) Oral every 6 hours  ceFAZolin   IVPB 1000 milliGRAM(s) IV Intermittent every 8 hours  docusate sodium 100 milliGRAM(s) Oral three times a day  gemfibrozil 600 milliGRAM(s) Oral two times a day  heparin  Injectable 5000 Unit(s) SubCutaneous every 12 hours  ketorolac   Injectable 15 milliGRAM(s) IV Push every 6 hours  lisinopril 20 milliGRAM(s) Oral daily  pantoprazole    Tablet 40 milliGRAM(s) Oral before breakfast  senna 2 Tablet(s) Oral at bedtime  sodium chloride 0.9%. 1000 milliLiter(s) (125 mL/Hr) IV Continuous <Continuous>  sodium chloride 0.9%. 1000 milliLiter(s) (1000 mL/Hr) IV Continuous <Continuous>    MEDICATIONS  (PRN):  ondansetron Injectable 4 milliGRAM(s) IV Push every 6 hours PRN Nausea and/or Vomiting  oxyCODONE    IR 5 milliGRAM(s) Oral every 6 hours PRN breakthrough pain        SOCIAL HISTORY:    [x ] Non-smoker  [ ] Smoker  [x ] No Alcohol     REVIEW OF SYSTEMS:  CONSTITUTIONAL: No fever, weight loss, or fatigue  CARDIOLOGY: PAtient denies chest pain, shortness of breath or syncopal episodes.   RESPIRATORY: denies shortness of breath, wheezeing.   NEUROLOGICAL: NO weakness, no focal deficits to report.  GI: no BRBPR, no N,V,diarrhea.    PSYCHIATRY: normal mood and affect  HEENT: no nasal discharge, no ecchymosis  SKIN: no ecchymosis, no breakdown  MUSCULOSKELETAL: Full range of motion x4.      PHYSICAL EXAM:  T(C): 36.6 (04-17-19 @ 21:03), Max: 36.7 (04-17-19 @ 14:25)  HR: 105 (04-17-19 @ 21:03) (58 - 105)  BP: 125/70 (04-17-19 @ 21:03) (119/80 - 153/89)  RR: 18 (04-17-19 @ 21:03) (12 - 20)  SpO2: 97% (04-17-19 @ 16:25) (96% - 100%)  Wt(kg): --  I&O's Summary    17 Apr 2019 07:01  -  18 Apr 2019 04:04  --------------------------------------------------------  IN: 375 mL / OUT: 1110 mL / NET: -735 mL      Daily Height in cm: 180.34 (17 Apr 2019 07:14)    Daily     General Appearance: Normal	  Cardiovascular: Normal S1 S2, No JVD, No murmurs, No edema  Respiratory: Lungs clear to auscultation	  Psychiatry: A & O x 3, Mood & affect appropriate  Gastrointestinal:  Soft, Non-tender  Skin: No rashes, No ecchymoses, No cyanosis	  Neurologic: Non-focal  Extremities: Normal range of motion, No clubbing, cyanosis or edema  Vascular: Peripheral pulses palpable 2+ bilaterally        LABS:	 	                        12.1   20.02 )-----------( 280      ( 17 Apr 2019 13:15 )             37.9     04-17    139  |  104  |  20  ----------------------------<  142<H>  4.4   |  22  |  1.2    Ca    9.8      17 Apr 2019 13:15         	    ECG: mild sinus tachycardia (HR ~ 100), non specific changes  	  RADIOLOGY:  < from: Xray Chest 2 Views PA/Lat (04.03.19 @ 16:21) >  Impression:      No radiographic evidence of acute cardiopulmonary disease.    < end of copied text > HISTORY OF PRESENT ILLNESS:   64 years old male patient with PMHx of stable aortic aneurysm (4.4 cm with regular follow up on yearly basis with CT chest), HTN (controlled), DL   s/p robotic assisted radical prostatectomy with extensive node dissection on 4/17 with major events per op. Patient admitted for surgical floor for further care. Cardiology team consulted for tachycardia found while patient was walking.   Patient seen at bedside, looks comfortable, denies chest pain, sob or palpitations, no dizziness or syncope. He has been NPO ~ 24 hrs in total. currently on liquid diet.  EKG showed sinus rhythm with non specific changes chronic when compared to EKG done on 2015.   Bedside limited echo showed normal EF with hyperdynamic LV. on physical exam patient is dehydrated.   Hemodynamically stable     PAST MEDICAL & SURGICAL HISTORY:  H/O detached retina repair: left eye  Prostate cancer  Serum calcium elevated  Back pain  GERD (gastroesophageal reflux disease)  High cholesterol  HTN (hypertension)  Asthma: last attack 3-4 years ago  last used ventolin 3 weeks ago  History of surgery: right tibia/fibula  History of tonsillectomy    FAMILY HISTORY:  FH: asthma  FH: Parkinson's disease  FH: brain tumor  FH: prostate cancer    Allergies    No Known Allergies    Intolerances    	  Home Medications:  Advair Diskus 250 mcg-50 mcg inhalation powder: used during high pollen seasons (17 Apr 2019 06:22)  aspirin 81 mg oral tablet, chewable: 1 tab(s) orally once a day (17 Apr 2019 06:22)  gemfibrozil 600 mg oral tablet: 1 tab(s) orally 2 times a day (17 Apr 2019 06:22)  lisinopril 20 mg oral tablet: 1 tab(s) orally once a day (17 Apr 2019 06:22)  Ventolin HFA 90 mcg/inh inhalation aerosol:  (17 Apr 2019 06:22)    MEDICATIONS  (STANDING):  acetaminophen   Tablet .. 1000 milliGRAM(s) Oral every 6 hours  ceFAZolin   IVPB 1000 milliGRAM(s) IV Intermittent every 8 hours  docusate sodium 100 milliGRAM(s) Oral three times a day  gemfibrozil 600 milliGRAM(s) Oral two times a day  heparin  Injectable 5000 Unit(s) SubCutaneous every 12 hours  ketorolac   Injectable 15 milliGRAM(s) IV Push every 6 hours  lisinopril 20 milliGRAM(s) Oral daily  pantoprazole    Tablet 40 milliGRAM(s) Oral before breakfast  senna 2 Tablet(s) Oral at bedtime  sodium chloride 0.9%. 1000 milliLiter(s) (125 mL/Hr) IV Continuous <Continuous>  sodium chloride 0.9%. 1000 milliLiter(s) (1000 mL/Hr) IV Continuous <Continuous>    MEDICATIONS  (PRN):  ondansetron Injectable 4 milliGRAM(s) IV Push every 6 hours PRN Nausea and/or Vomiting  oxyCODONE    IR 5 milliGRAM(s) Oral every 6 hours PRN breakthrough pain        SOCIAL HISTORY:    [x ] Non-smoker  [ ] Smoker  [x ] No Alcohol     REVIEW OF SYSTEMS:  CONSTITUTIONAL: No fever, weight loss, or fatigue  CARDIOLOGY: PAtient denies chest pain, shortness of breath or syncopal episodes.   RESPIRATORY: denies shortness of breath, wheezeing.   NEUROLOGICAL: NO weakness, no focal deficits to report.  GI: no BRBPR, no N,V,diarrhea.    PSYCHIATRY: normal mood and affect  HEENT: no nasal discharge, no ecchymosis  SKIN: no ecchymosis, no breakdown  MUSCULOSKELETAL: Full range of motion x4.      PHYSICAL EXAM:  T(C): 36.6 (04-17-19 @ 21:03), Max: 36.7 (04-17-19 @ 14:25)  HR: 105 (04-17-19 @ 21:03) (58 - 105)  BP: 125/70 (04-17-19 @ 21:03) (119/80 - 153/89)  RR: 18 (04-17-19 @ 21:03) (12 - 20)  SpO2: 97% (04-17-19 @ 16:25) (96% - 100%)  Wt(kg): --  I&O's Summary    17 Apr 2019 07:01  -  18 Apr 2019 04:04  --------------------------------------------------------  IN: 375 mL / OUT: 1110 mL / NET: -735 mL      Daily Height in cm: 180.34 (17 Apr 2019 07:14)    Daily     General Appearance: Normal	  Cardiovascular: Normal S1 S2, No JVD, No murmurs, No edema  Respiratory: Lungs clear to auscultation	  Psychiatry: A & O x 3, Mood & affect appropriate  Gastrointestinal:  Soft, Non-tender  Skin: No rashes, No ecchymoses, No cyanosis	  Neurologic: Non-focal  Extremities/MS: Normal range of motion, No clubbing, cyanosis or edema  Vascular: Peripheral pulses palpable 2+ bilaterally        LABS:	 	                        12.1   20.02 )-----------( 280      ( 17 Apr 2019 13:15 )             37.9     04-17    139  |  104  |  20  ----------------------------<  142<H>  4.4   |  22  |  1.2    Ca    9.8      17 Apr 2019 13:15         	    ECG: mild sinus tachycardia (HR ~ 100), non specific changes  	  RADIOLOGY:  < from: Xray Chest 2 Views PA/Lat (04.03.19 @ 16:21) >  Impression:      No radiographic evidence of acute cardiopulmonary disease.    < end of copied text >

## 2019-04-19 ENCOUNTER — TRANSCRIPTION ENCOUNTER (OUTPATIENT)
Age: 64
End: 2019-04-19

## 2019-04-19 VITALS
HEART RATE: 66 BPM | SYSTOLIC BLOOD PRESSURE: 121 MMHG | TEMPERATURE: 97 F | RESPIRATION RATE: 18 BRPM | WEIGHT: 211.86 LBS | DIASTOLIC BLOOD PRESSURE: 59 MMHG

## 2019-04-19 RX ORDER — ACETAMINOPHEN 500 MG
2 TABLET ORAL
Qty: 0 | Refills: 0 | DISCHARGE
Start: 2019-04-19

## 2019-04-19 RX ORDER — IBUPROFEN 200 MG
1 TABLET ORAL
Qty: 20 | Refills: 0
Start: 2019-04-19 | End: 2019-04-23

## 2019-04-19 RX ORDER — DOCUSATE SODIUM 100 MG
1 CAPSULE ORAL
Qty: 0 | Refills: 0 | DISCHARGE
Start: 2019-04-19

## 2019-04-19 RX ORDER — AZTREONAM 2 G
1 VIAL (EA) INJECTION
Qty: 6 | Refills: 0
Start: 2019-04-19 | End: 2019-04-21

## 2019-04-19 RX ORDER — OXYCODONE HYDROCHLORIDE 5 MG/1
1 TABLET ORAL
Qty: 8 | Refills: 0
Start: 2019-04-19 | End: 2019-04-20

## 2019-04-19 RX ADMIN — Medication 1000 MILLIGRAM(S): at 01:22

## 2019-04-19 RX ADMIN — Medication 600 MILLIGRAM(S): at 06:21

## 2019-04-19 RX ADMIN — Medication 15 MILLIGRAM(S): at 01:45

## 2019-04-19 RX ADMIN — Medication 1000 MILLIGRAM(S): at 06:20

## 2019-04-19 RX ADMIN — PANTOPRAZOLE SODIUM 40 MILLIGRAM(S): 20 TABLET, DELAYED RELEASE ORAL at 06:21

## 2019-04-19 RX ADMIN — HEPARIN SODIUM 5000 UNIT(S): 5000 INJECTION INTRAVENOUS; SUBCUTANEOUS at 06:22

## 2019-04-19 RX ADMIN — Medication 100 MILLIGRAM(S): at 06:20

## 2019-04-19 RX ADMIN — LISINOPRIL 20 MILLIGRAM(S): 2.5 TABLET ORAL at 06:21

## 2019-04-19 RX ADMIN — Medication 15 MILLIGRAM(S): at 09:08

## 2019-04-19 RX ADMIN — Medication 15 MILLIGRAM(S): at 11:42

## 2019-04-19 NOTE — PROGRESS NOTE ADULT - ASSESSMENT
63 y/o Male s/p robotic assisted radical prostatectomy with extensive node dissection.  Pt doing well, c/o less soreness. No acute events overnight.    A) Stable POD # 2  s/p radical robotic assisted prostatectomy with node dissection    P) D/C MOLLY drain, dressing applied  D/C home today  office will call to schedule f/u appointment  d/w attending

## 2019-04-19 NOTE — PROGRESS NOTE ADULT - SUBJECTIVE AND OBJECTIVE BOX
Progress Note POD # 2    Subjective  65 y/o Male s/p robotic assisted radical prostatectomy with extensive node dissection.  Pt doing well, c/o less soreness. No acute events overnight.    [ x ] a 10 point review of systems was negative except where noted    [  ]  Due to altered mental status/intubation, subjective information was not able t be obtained from the patient.  History was obtained, to the extent possible, from review of the chart and collateral sources of information    Vital signs  T(C): , Max: 36.9 (04-18-19 @ 20:51)  HR: 66 (04-19-19 @ 04:56)  BP: 121/59 (04-19-19 @ 04:56)    Gen in NAD  Abd Soft non tender, mild incisional discomfort  MOLLY serosanguinous output 110 cc last shift, 300 cc/24   Vargas drains slightly tinged urine    Labs                        10.4   9.68  )-----------( 232      ( 18 Apr 2019 08:46 )             32.3     18 Apr 2019 09:18    x      |  x      |  x      ----------------------------<  x      x       |  x      |  1.1      Ca    9.3        18 Apr 2019 08:46

## 2019-04-19 NOTE — DISCHARGE NOTE NURSING/CASE MANAGEMENT/SOCIAL WORK - NSDCDPATPORTLINK_GEN_ALL_CORE
You can access the [facility], offered by [facility], by registering with the following website: [email or web address]

## 2019-04-22 LAB — SURGICAL PATHOLOGY STUDY: SIGNIFICANT CHANGE UP

## 2019-04-23 ENCOUNTER — FORM ENCOUNTER (OUTPATIENT)
Age: 64
End: 2019-04-23

## 2019-04-23 DIAGNOSIS — J45.909 UNSPECIFIED ASTHMA, UNCOMPLICATED: ICD-10-CM

## 2019-04-23 DIAGNOSIS — I10 ESSENTIAL (PRIMARY) HYPERTENSION: ICD-10-CM

## 2019-04-23 DIAGNOSIS — E78.5 HYPERLIPIDEMIA, UNSPECIFIED: ICD-10-CM

## 2019-04-23 DIAGNOSIS — C61 MALIGNANT NEOPLASM OF PROSTATE: ICD-10-CM

## 2019-04-23 DIAGNOSIS — Z80.42 FAMILY HISTORY OF MALIGNANT NEOPLASM OF PROSTATE: ICD-10-CM

## 2019-04-23 DIAGNOSIS — R00.0 TACHYCARDIA, UNSPECIFIED: ICD-10-CM

## 2019-04-23 DIAGNOSIS — I71.4 ABDOMINAL AORTIC ANEURYSM, WITHOUT RUPTURE: ICD-10-CM

## 2019-04-23 DIAGNOSIS — E86.0 DEHYDRATION: ICD-10-CM

## 2019-04-23 DIAGNOSIS — Z83.6 FAMILY HISTORY OF OTHER DISEASES OF THE RESPIRATORY SYSTEM: ICD-10-CM

## 2019-04-23 DIAGNOSIS — Z92.0 PERSONAL HISTORY OF CONTRACEPTION: ICD-10-CM

## 2019-04-24 ENCOUNTER — OUTPATIENT (OUTPATIENT)
Dept: OUTPATIENT SERVICES | Facility: HOSPITAL | Age: 64
LOS: 1 days | Discharge: HOME | End: 2019-04-24
Payer: COMMERCIAL

## 2019-04-24 DIAGNOSIS — Z98.890 OTHER SPECIFIED POSTPROCEDURAL STATES: Chronic | ICD-10-CM

## 2019-04-24 DIAGNOSIS — C61 MALIGNANT NEOPLASM OF PROSTATE: ICD-10-CM

## 2019-04-24 DIAGNOSIS — Z90.89 ACQUIRED ABSENCE OF OTHER ORGANS: Chronic | ICD-10-CM

## 2019-04-24 PROCEDURE — 74455 X-RAY URETHRA/BLADDER: CPT | Mod: 26

## 2019-04-24 PROCEDURE — 51600 INJECTION FOR BLADDER X-RAY: CPT

## 2019-04-25 ENCOUNTER — APPOINTMENT (OUTPATIENT)
Dept: UROLOGY | Facility: CLINIC | Age: 64
End: 2019-04-25
Payer: COMMERCIAL

## 2019-04-25 PROCEDURE — 99024 POSTOP FOLLOW-UP VISIT: CPT

## 2019-04-26 NOTE — PHYSICAL EXAM
[General Appearance - Well Developed] : well developed [General Appearance - Well Nourished] : well nourished [Well Groomed] : well groomed [Normal Appearance] : normal appearance [Abdomen Tenderness] : non-tender [General Appearance - In No Acute Distress] : no acute distress [Abdomen Soft] : soft [Costovertebral Angle Tenderness] : no ~M costovertebral angle tenderness [Urinary Bladder Findings] : the bladder was normal on palpation [Urethral Meatus] : meatus normal [Testes Mass (___cm)] : there were no testicular masses [Scrotum] : the scrotum was normal [Edema] : no peripheral edema [FreeTextEntry1] : boyer clear [] : no respiratory distress [Respiration, Rhythm And Depth] : normal respiratory rhythm and effort [Exaggerated Use Of Accessory Muscles For Inspiration] : no accessory muscle use [Oriented To Time, Place, And Person] : oriented to person, place, and time [Affect] : the affect was normal [Mood] : the mood was normal [Not Anxious] : not anxious [Normal Station and Gait] : the gait and station were normal for the patient's age [No Focal Deficits] : no focal deficits [No Palpable Adenopathy] : no palpable adenopathy

## 2019-04-26 NOTE — ASSESSMENT
[FreeTextEntry1] : 64M hx high risk PCa on pnbx sp RALP/ePLND 4/17/19 pathology Gl 4+3 (t5) PCa, neg margins, 0/14 LN neg.\par Downgraded on final path.\par Doing well.\par \par Vargas dced today.\par ER precautions given if unable to void 8 hours.\par Fu 4-6 weeks PSA prior.

## 2019-04-26 NOTE — HISTORY OF PRESENT ILLNESS
[FreeTextEntry1] : 64M hx high risk PCa on pnbx sp RALP/ePLND 4/17/19 pathology Gl 4+3 (t5) PCa, neg margins, 0/14 LN neg.\par Downgraded on final path.\par Doing well.\par No f/c. No CP/SOB. Having flatus/BMs.\par Josephine diet.\par Vargas draining well.\par Cystogram neg for leak.

## 2019-04-29 ENCOUNTER — OUTPATIENT (OUTPATIENT)
Dept: OUTPATIENT SERVICES | Facility: HOSPITAL | Age: 64
LOS: 1 days | Discharge: HOME | End: 2019-04-29

## 2019-04-29 ENCOUNTER — APPOINTMENT (OUTPATIENT)
Dept: UROLOGY | Facility: CLINIC | Age: 64
End: 2019-04-29
Payer: COMMERCIAL

## 2019-04-29 VITALS
WEIGHT: 202 LBS | DIASTOLIC BLOOD PRESSURE: 77 MMHG | HEIGHT: 78 IN | HEART RATE: 74 BPM | SYSTOLIC BLOOD PRESSURE: 125 MMHG | BODY MASS INDEX: 23.37 KG/M2

## 2019-04-29 DIAGNOSIS — Z98.890 OTHER SPECIFIED POSTPROCEDURAL STATES: Chronic | ICD-10-CM

## 2019-04-29 DIAGNOSIS — Z90.89 ACQUIRED ABSENCE OF OTHER ORGANS: Chronic | ICD-10-CM

## 2019-04-29 DIAGNOSIS — C61 MALIGNANT NEOPLASM OF PROSTATE: ICD-10-CM

## 2019-04-29 PROCEDURE — 99024 POSTOP FOLLOW-UP VISIT: CPT

## 2019-04-29 NOTE — PHYSICAL EXAM
[General Appearance - Well Developed] : well developed [General Appearance - Well Nourished] : well nourished [Normal Appearance] : normal appearance [Well Groomed] : well groomed [General Appearance - In No Acute Distress] : no acute distress [Abdomen Soft] : soft [Abdomen Tenderness] : non-tender [Costovertebral Angle Tenderness] : no ~M costovertebral angle tenderness [FreeTextEntry1] : incisions cdi [Urethral Meatus] : meatus normal [Urinary Bladder Findings] : the bladder was normal on palpation [Scrotum] : the scrotum was normal [Testes Mass (___cm)] : there were no testicular masses [Edema] : no peripheral edema [] : no respiratory distress [Respiration, Rhythm And Depth] : normal respiratory rhythm and effort [Exaggerated Use Of Accessory Muscles For Inspiration] : no accessory muscle use [Oriented To Time, Place, And Person] : oriented to person, place, and time [Affect] : the affect was normal [Mood] : the mood was normal [Not Anxious] : not anxious [Normal Station and Gait] : the gait and station were normal for the patient's age [No Focal Deficits] : no focal deficits [No Palpable Adenopathy] : no palpable adenopathy

## 2019-04-29 NOTE — HISTORY OF PRESENT ILLNESS
[FreeTextEntry1] : 64M hx high risk PCa on pnbx sp RALP/ePLND 4/17/19 pathology Gl 4+3 (t5) PCa, neg margins, 0/14 LN neg.\par Downgraded on final path.\par \par Patient was having bladder spasms over the weekend with occasional dysuria. He reports that after the catheter was removed he had difficulty urinating until the 8 hour gume. Bladder spasms improved with oxybutynin however states that last night 2 AM he began began having difficulty urinating with only small drops.\par \par Bladder scan today revealed 160 mL Denies f/c.\par

## 2019-04-29 NOTE — ASSESSMENT
[FreeTextEntry1] : 64M hx high risk PCa on pnbx sp RALP/ePLND 4/17/19 pathology Gl 4+3 (t5) PCa, neg margins, 0/14 LN neg.\par Downgraded on final path.\par \par Recommended to the patient NSAIDS for pain.\par Sitz bath\par oxybutynin prn bladder spasms\par Although PVR was high earlier this AM, I spoke with patient later today and he confirmed he is now voiding well w 100 ml each urination.\par UA UCx to r.o infection\par Fu 4-6 weeks PSA prior

## 2019-05-01 LAB — BACTERIA UR CULT: NORMAL

## 2019-05-27 ENCOUNTER — RX RENEWAL (OUTPATIENT)
Age: 64
End: 2019-05-27

## 2019-05-28 ENCOUNTER — LABORATORY RESULT (OUTPATIENT)
Age: 64
End: 2019-05-28

## 2019-05-28 ENCOUNTER — OUTPATIENT (OUTPATIENT)
Dept: OUTPATIENT SERVICES | Facility: HOSPITAL | Age: 64
LOS: 1 days | Discharge: HOME | End: 2019-05-28

## 2019-05-28 DIAGNOSIS — C61 MALIGNANT NEOPLASM OF PROSTATE: ICD-10-CM

## 2019-05-28 DIAGNOSIS — Z90.89 ACQUIRED ABSENCE OF OTHER ORGANS: Chronic | ICD-10-CM

## 2019-05-28 DIAGNOSIS — Z98.890 OTHER SPECIFIED POSTPROCEDURAL STATES: Chronic | ICD-10-CM

## 2019-06-06 ENCOUNTER — APPOINTMENT (OUTPATIENT)
Dept: UROLOGY | Facility: CLINIC | Age: 64
End: 2019-06-06
Payer: COMMERCIAL

## 2019-06-06 VITALS
HEART RATE: 71 BPM | DIASTOLIC BLOOD PRESSURE: 81 MMHG | WEIGHT: 200 LBS | SYSTOLIC BLOOD PRESSURE: 129 MMHG | HEIGHT: 71 IN | BODY MASS INDEX: 28 KG/M2

## 2019-06-06 PROCEDURE — 99024 POSTOP FOLLOW-UP VISIT: CPT

## 2019-06-06 NOTE — ASSESSMENT
[FreeTextEntry1] : 64M hx high risk PCa on pnbx sp RALP/ePLND 4/17/19 pathology Gl 4+3 (t5) PCa, neg margins, 0/14 LN neg.\par Downgraded on final path.\par Doing well.\par \par PSA 0.03\par Pt will dc flomax.\par I believe that the patient's low capacity bladder may be contributing to the urinary incontinence in addition to the postoperative intrinsic sphincter deficiency. For this reason recommending anticholinergic which pt already has at home.\par Fu 3 mo w PSA prior \par

## 2019-06-06 NOTE — LETTER BODY
[Consult Letter:] : I had the pleasure of evaluating your patient, [unfilled]. [Dear  ___] : Dear  [unfilled], [Consult Closing:] : Thank you very much for allowing me to participate in the care of this patient.  If you have any questions, please do not hesitate to contact me. [Please see my note below.] : Please see my note below. [Sincerely,] : Sincerely, [FreeTextEntry3] : Ward Meeyr MD\par [facility]\par [facility]\par Division of Urology\par  [FreeTextEntry2] : Edmund Westfall MD\par [address] [place] [address]\[place] [address]\par  [DrSherman  ___] : Dr. PHAM

## 2019-06-06 NOTE — LETTER HEADER
[FreeTextEntry3] : Ward Meyer MD\par [facility]imally Invasive Surgery\par \par [facility]\par Division of Urology\par [address]\par [address]\[place] [address]\par Tel [phone]\par Fax [phone]\par \par

## 2019-06-06 NOTE — HISTORY OF PRESENT ILLNESS
[FreeTextEntry1] : 64M hx high risk PCa on pnbx sp RALP/ePLND 4/17/19 pathology Gl 4+3 (t5) PCa, neg margins, 0/14 LN neg.\par Downgraded on final path.\par Doing well.\par Postoperatively he was actually hypercontinent and had elevated postvoid residuals for which we started Flomax. Today his postvoid residual is zero.\par He is complaining of increased frequency and urgency and as expected urinary incontinence which is improving.\par \par PSA 0.03

## 2019-08-18 NOTE — ASU DISCHARGE PLAN (ADULT/PEDIATRIC) - DRIVING
CONSULT NOTE    Alicia Ramsay    8/18/2019    Date of Admission:  8/16/2019    The patient's chart is reviewed and the patient is discussed with the staff. Patient is a 64 y.o. rwhite male seen and evaluated at the request of Dr. Sameer Cornelius. Patient was seen by Pulmonary 8/2/19 in the emergency room. Had BNP 1323 chest x-ray and CT showing left effusions. He was given Lasix and discharged with prednisone and scheduled to follow up outpatient. He presented to the office 8/16 with near syncopal episode, increased shortness of breath and edema of lower extremities and was taken to the emergency room. Chest x-ray showed no significant change from 8/2. He had a left thoracentesis 8/2  by Dr. Mindy Meyers and 500 ml were removed. Fluid was sent for testing culture showed no growth, and the acid fast smear was negative. BNP on admission 1164, troponin negative. BNP today 1595, receiving Lasix 80 mg bid and spironolactone. 8/17/19 Echocardiogram- Ejection fraction was estimated in   The range of 10 % to 15 %. There was a thrombus noted along the [place] and Apical  septal wall. There was severe diffuse hypokinesis. Echo cardiogram in January EF 20 to 25%        Chronic medical: AFib/flutter, s/p Watchman, CKD  Systolic heart failure with EF 20-25% in January, BiV ICD, CAD with hx CABG, hx tobacco abuse( smoked pipe). Subjective:     Patient with the above issues. Was more short of breath but states feeling better with diuresis. Currently on room air. Clearly stating that he wants to pursue a palliative approach with his care from here on.      Review of Systems  + shortness of breath, edema and weakness    Patient Active Problem List   Diagnosis Code    Ischemic cardiomyopathy I25.5    CAD (coronary artery disease) I25.10    Biventricular ICD (implantable cardioverter-defibrillator) in place Z95.810    Hypothyroidism E03.9    Hyperlipidemia E78.5    Essential hypertension, benign I10  Paroxysmal ventricular tachycardia (HCC) I47.2    Coronary atherosclerosis of native coronary vessel I25.10    Rheumatic mitral and aortic valve insufficiency I08.0    Unstable angina (HCC) I20.0    SOB (shortness of breath) R06.02    PVC (premature ventricular contraction) I49.3    Acute on chronic systolic congestive heart failure (HCC) I50.23    Other and unspecified angina pectoris I20.9    Atypical atrial flutter (HCC) I48.4    Chronic systolic CHF (congestive heart failure) (HCC) I50.22    Persistent atrial fibrillation (HCC) I48.1    Long-term use of high-risk medication Z79.899    Congestive heart failure (HCC) I50.9    Chronic kidney disease N18.9    Coronary arteriosclerosis in native artery I25.10    History of high risk medication treatment Z92.29    Hypertension I10    History of coronary artery bypass surgery Z95.1    Cardiac defibrillator in place Z95.810    Myocardial infarction ([facility]) I21.9    Chronic ischemic heart disease I25.9    History of cardiovascular disorder Z86.79    Subclavian vein thrombosis (HCC) I82. B19    Ventricular tachycardia (HCC) I47.2    Arrhythmia I49.9    Heart failure (HCC) I50.9    Hx of CABG Z95.1    Pleural effusion J90    Hypoxia R09.02    History of tobacco abuse Z87.891    Syncope R55           Prior to Admission Medications   Prescriptions Last Dose Informant Patient Reported? Taking? ALPRAZolam (XANAX) 0.5 mg tablet   Yes No   Sig: Take 0.5 mg by mouth nightly as needed. OTHER   Yes No   Sig: daily. Tumeric daily   aspirin 81 mg chewable tablet   Yes No   Sig: Take 81 mg by mouth daily. carvedilol (COREG) 12.5 mg tablet   No No   Sig: Take 1 Tab by mouth two (2) times daily (with meals). dofetilide (TIKOSYN) 250 mcg capsule   No No   Sig: Take 1 Cap by mouth two (2) times a day. finasteride (PROSCAR) 5 mg tablet   Yes No   Sig: Take 5 mg by mouth daily.    fluticasone furoate-vilanterol (BREO ELLIPTA) 100-25 mcg/dose inhaler No No   Sig: Take 1 Puff by inhalation daily. furosemide (LASIX) 40 mg tablet   No No   Sig: Take 1 Tab by mouth as needed (for weight gain 3 lbs overnight/ 5 lbs in week). If light headedness occurs, take 0.5 tablet daily. levothyroxine (SYNTHROID) 125 mcg tablet   Yes No   Sig: Take 125 mcg by mouth Daily (before breakfast). multivitamin (ONE A DAY) tablet   Yes No   Sig: Take 1 Tab by mouth daily. omega-3 fatty acids-vitamin e 2,000-650-12 mg/2.5 gram elpk   Yes No   Sig: Take 2,000 mg by mouth daily. spironolactone (ALDACTONE) 25 mg tablet   No No   Sig: Take 1 Tab by mouth daily. Facility-Administered Medications: None       Past Medical History:   Diagnosis Date    Acute diastolic CHF (congestive heart failure) ([address] 9/15/2015    Arrhythmia     afib, icd    Atypical atrial flutter ([address] 4/14/2016    Biventricular ICD (implantable cardioverter-defibrillator) in place 2004    CAD (coronary artery disease)     stents heart 2002, cabg 2002    Chest pain 9/15/2015    Chronic systolic CHF (congestive heart failure) ([address] 4/28/2016    Coronary atherosclerosis of native coronary vessel 9/15/2015    Dyspnea 9/15/2015    Essential hypertension, benign 9/15/2015    Heart failure ([address]     Hx of CABG 2004    Hyperlipidemia 9/15/2015    Hypertension     Hypothyroidism 6/3/2014    Ischemic cardiomyopathy 6/3/2014    Long-term use of high-risk medication 5/23/2016    Myocardial infarction acute ([address] 9/15/2015    Other and unspecified angina pectoris 9/15/2015    Palpitations 9/15/2015    Paroxysmal ventricular tachycardia ([address] 9/15/2015    Persistent atrial fibrillation ([address] 5/6/2016    1. Attempt A. Fib ablation at [address] - 2015 2.  CV - Tikosyn - May 2016     PVC (premature ventricular contraction) 9/15/2015    Renal insufficiency 9/15/2015    Rheumatic mitral and aortic valve insufficiency 9/15/2015    Unstable angina ([address] 9/15/2015     Past Surgical History:   Procedure Laterality Date    CARDIAC SURG PROCEDURE UNLIST      cabg 2004    HX HEENT      tonsillectomy    HX PACEMAKER      2004     Social History     Socioeconomic History    Marital status:      Spouse name: Not on file    Number of children: Not on file    Years of education: Not on file    Highest education level: Not on file   Occupational History    Not on file   Social Needs    Financial resource strain: Not on file    Food insecurity:     Worry: Not on file     Inability: Not on file    Transportation needs:     Medical: Not on file     Non-medical: Not on file   Tobacco Use    Smoking status: Former Smoker     Last attempt to quit: 2003     Years since quittin.6    Smokeless tobacco: Never Used    Tobacco comment: pipe quit 10 years ago   Substance and Sexual Activity    Alcohol use: No    Drug use: No    Sexual activity: Not on file   Lifestyle    Physical activity:     Days per week: Not on file     Minutes per session: Not on file    Stress: Not on file   Relationships    Social connections:     Talks on phone: Not on file     Gets together: Not on file     Attends Mandaeism service: Not on file     Active member of club or organization: Not on file     Attends meetings of clubs or organizations: Not on file     Relationship status: Not on file    Intimate partner violence:     Fear of current or ex partner: Not on file     Emotionally abused: Not on file     Physically abused: Not on file     Forced sexual activity: Not on file   Other Topics Concern    Not on file   Social History Narrative    Not on file     Family History   Problem Relation Age of Onset    Heart Attack Other     Heart Failure Other         Congestive     Allergies   Allergen Reactions    Pradaxa [Dabigatran Etexilate] Not Reported This Time and Anaphylaxis    Shellfish Derived Nausea and Vomiting       Current Facility-Administered Medications   Medication Dose Route Frequency    acetaminophen (TYLENOL) tablet 650 mg  650 mg Oral Q4H PRN    HYDROcodone-acetaminophen (NORCO) 5-325 mg per tablet 1 Tab  1 Tab Oral Q4H PRN    naloxone (NARCAN) injection 0.4 mg  0.4 mg IntraVENous PRN    bisacodyl (DULCOLAX) tablet 10 mg  10 mg Oral DAILY PRN    heparin (porcine) injection 5,000 Units  5,000 Units SubCUTAneous Q8H    ALPRAZolam (XANAX) tablet 0.5 mg  0.5 mg Oral QHS PRN    aspirin chewable tablet 81 mg  81 mg Oral DAILY    carvedilol (COREG) tablet 12.5 mg  12.5 mg Oral BID WITH MEALS    dofetilide (TIKOSYN) capsule 250 mcg  250 mcg Oral Q12H    finasteride (PROSCAR) tablet 5 mg  5 mg Oral DAILY    furosemide (LASIX) tablet 40 mg  40 mg Oral DAILY PRN    levothyroxine (SYNTHROID) tablet 125 mcg  125 mcg Oral ACB    spironolactone (ALDACTONE) tablet 25 mg  25 mg Oral DAILY    lisinopril (PRINIVIL, ZESTRIL) tablet 5 mg  5 mg Oral DAILY    budesonide (PULMICORT) 500 mcg/2 ml nebulizer suspension  500 mcg Nebulization BID RT    And    albuterol (PROVENTIL VENTOLIN) nebulizer solution 2.5 mg  2.5 mg Nebulization Q6HWA RT    furosemide (LASIX) injection 80 mg  80 mg IntraVENous Q12H         Objective:     Vitals:    08/18/19 0214 08/18/19 0318 08/18/19 0728 08/18/19 1058   BP:  118/78 119/85 104/64   Pulse:  61 76 91   Resp:  17 19 19   Temp:  98.4 °F (36.9 °C) 98.4 °F (36.9 °C) 98.5 °F (36.9 °C)   SpO2:  98% 96% 95%   Weight: 189 lb 9.5 oz (86 kg)      Height:           PHYSICAL EXAM     Constitutional:  the patient is well developed and in no acute distress  EENMT:  Sclera clear, pupils equal, oral mucosa moist  Respiratory: crackles and decreased at bases  Cardiovascular:  RRR without M,G,R. Trace B LE edema. Gastrointestinal: soft and non-tender; with positive bowel sounds. Musculoskeletal: warm without cyanosis.   Skin:  no jaundice or rashes,   Neurologic: no gross neuro deficits     Psychiatric:  alert and oriented  CXR:    8/16/19        Recent Labs     08/18/19  1122 08/17/19  0725 08/16/19  1329   WBC 7.9 9.5 9.1   HGB 15.2 15.2 16.0   HCT 46.2 45.5 48.2   * 117* 108*     Recent Labs     08/18/19  1122 08/18/19  0723 08/17/19  0725 08/16/19  1718     --  138 137   K 3.9  --  3.8 4.5     --  102 103   *  --  91 104*   CO2 23  --  25 25   BUN 25*  --  30* 28*   CREA 1.51*  --  1.47 1.50   MG  --  2.4  --   --    CA 9.1  --  9.3 10.1   TROIQ  --   --   --  0.02   ALB 3.5  --  3.4 3.8   TBILI 1.1  --  1.0 1.2*   ALT 23  --  20 27   SGOT 28  --  22 34     Recent Labs     08/16/19  1734   PHI 7.429   PCO2I 30.7*   PO2I 75   HCO3I 20.3*     No results for input(s): LCAD, LAC in the last 72 hours. Assessment:  (Medical Decision Making)     Hospital Problems  Date Reviewed: 8/2/2019          Codes Class Noted POA    Syncope ICD-10-CM: R55  ICD-9-CM: 780.2  8/17/2019 Yes        Pleural effusion ICD-10-CM: J90  ICD-9-CM: 511.9  8/2/2019 Yes    Overview Signed 8/18/2019 12:51 PM by Panfilo Beltran NP     8/18 thoracentesis 500 ml              Persistent atrial fibrillation ([facility] ICD-10-CM: I48.1  ICD-9-CM: 427.31  5/6/2016 Yes    Overview Addendum 5/6/2016 11:59 AM by Isaac Rivera     1. Attempt A. Fib ablation at [address] - 2015  2.  CV - Tikosyn - May 2016             Atypical atrial flutter ([facility] ICD-10-CM: I48.4  ICD-9-CM: 427.32  4/14/2016 Yes        Hyperlipidemia ICD-10-CM: E78.5  ICD-9-CM: 272.4  9/15/2015 Yes        Essential hypertension, benign ICD-10-CM: I10  ICD-9-CM: 401.1  9/15/2015 Yes        Coronary atherosclerosis of native coronary vessel ICD-10-CM: I25.10  ICD-9-CM: 414.01  9/15/2015 Yes        SOB (shortness of breath) ICD-10-CM: R06.02  ICD-9-CM: 786.05  9/15/2015 Yes        * (Principal) Acute on chronic systolic congestive heart failure (HCC) ICD-10-CM: I50.23  ICD-9-CM: 428.23, 428.0  9/15/2015 Yes        Hypertension ICD-10-CM: I10  ICD-9-CM: 401.9  11/17/2014 Yes        Chronic kidney disease ICD-10-CM: N18.9  ICD-9-CM: 585.9 10/10/2014 Yes        CAD (coronary artery disease) ICD-10-CM: I25.10  ICD-9-CM: 414.00  6/3/2014 Yes        Hypothyroidism ICD-10-CM: E03.9  ICD-9-CM: 244.9  6/3/2014 Yes              Plan:  (Medical Decision Making)     --agree with palliative/hospice care   -no plans for thoracentesis at this point given his overall goals of care.   -No additional pulmonary input at this time. We will sign off but please let us know if new issues arise. More than 50% of the time documented was spent in face-to-face contact with the patient and in the care of the patient on the floor/unit where the patient is located. Thank you very much for this referral.  We appreciate the opportunity to participate in this patient's care. Will follow along with above stated plan.     Kalia Berrios MD No...

## 2019-08-19 NOTE — ASU PREOP CHECKLIST - HEART RATE (BEATS/MIN)
[facility]    Phone Message    May a detailed message be left on voicemail: no    Reason for Call: Other: Cyn, a nurse with [facility], would like to speak with a nurse on care team to discuss the pt. Please follow up with Austen at [phone] when available. Thank you     Action Taken: Message routed to:  [facility] Ortho    
Message left on patient's voicemail to call to discuss plan concerning treatment for his shoulder.  
Message left to call the clinic to discuss patient's anticoagulation status and possible surgery.  
Patient is on ACC scheduled 8/21. Writer will personally review Dr. Guzman's recommendations below. UMA ZapataN, RN    
RN, can you please inform pt that I would recommend delaying surgery until a few weeks after completion of his treatment. Per up to date -     Thus, patients who require surgery within the first three months following an episode of VTE are likely to benefit from delaying elective surgery, even if the delay is only for a few weeks.     I would also recommend evaluation with hematology to see if he needs further work up due to DVT being unprovoked and they can also weigh in on when he can reschedule surgery. Referral placed.     
There are several encounters regarding patient's tentative surgery date and AC therapy. See 8/1/19 TE as well. ACC cannot make the determination if patient is okay for surgery while on AC therapy.    Routing to Dr. Guzman for advise, do you feel patient can/should proceed with shoulder surgery or wait until 3 months of AC therapy is completed? Patient is eager, however, pain is minimal.     Thanks! Cyn Womack, UMAN, RN    
Writer HARISH requesting call back. See message to be given below from Elvis Guzman MD.    Thanks! Archana Varner RN    
58

## 2019-09-06 ENCOUNTER — OUTPATIENT (OUTPATIENT)
Dept: OUTPATIENT SERVICES | Facility: HOSPITAL | Age: 64
LOS: 1 days | Discharge: HOME | End: 2019-09-06

## 2019-09-06 DIAGNOSIS — E55.9 VITAMIN D DEFICIENCY, UNSPECIFIED: ICD-10-CM

## 2019-09-06 DIAGNOSIS — E78.1 PURE HYPERGLYCERIDEMIA: ICD-10-CM

## 2019-09-06 DIAGNOSIS — E83.52 HYPERCALCEMIA: ICD-10-CM

## 2019-09-06 DIAGNOSIS — Z98.890 OTHER SPECIFIED POSTPROCEDURAL STATES: Chronic | ICD-10-CM

## 2019-09-06 DIAGNOSIS — I10 ESSENTIAL (PRIMARY) HYPERTENSION: ICD-10-CM

## 2019-09-06 DIAGNOSIS — Z90.89 ACQUIRED ABSENCE OF OTHER ORGANS: Chronic | ICD-10-CM

## 2019-09-06 DIAGNOSIS — R97.20 ELEVATED PROSTATE SPECIFIC ANTIGEN [PSA]: ICD-10-CM

## 2019-09-07 LAB
PSA FREE FLD-MCNC: NORMAL %
PSA FREE SERPL-MCNC: <0.01 NG/ML
PSA SERPL-MCNC: 0.05 NG/ML

## 2019-09-16 ENCOUNTER — APPOINTMENT (OUTPATIENT)
Dept: UROLOGY | Facility: CLINIC | Age: 64
End: 2019-09-16
Payer: COMMERCIAL

## 2019-09-16 PROCEDURE — 99215 OFFICE O/P EST HI 40 MIN: CPT

## 2019-09-16 NOTE — ASSESSMENT
[FreeTextEntry1] : 64M hx high risk PCa on pnbx sp RALP/ePLND 4/17/19 pathology Gl 4+3 (t5) PCa, neg margins, 0/14 LN neg.\par Downgraded on final path.\par \par I believe that his urinary incontinence has a significant urge incontinence component in addition to LASHELL. Pt had significant BPH sx prior to surgery and probably has degree of bladder remodeling. \par Recommend oxbutynin 10-15 mg per day. \par cont kegels\par if no improvement = mirabegron (or if side effects)/uro D/plevic floor PT\par PSA 3 mo\par sildenafil\par \par \par Based on the patient's history, he was prescribed a PDE5 inhibior. The patient understands that the risks of this medication include facial redness, flushing, nasal congestion, GERD, back pain, priapism, chest pain/MI, dizziness, drop in BP, changes in vision. ER precautions were given. The patient has been screened for potential medication interactions. He is not on any nitrates, po antifungals or HIV meds. He is not on alpha blockers.\par  I recommended that the patient take the first dose by himself. He knows that the medication requires approximately 45 minutes to have effect and works best on an empty stomach. He is aware sexual stimulation is required.\par \par \par

## 2019-09-16 NOTE — PHYSICAL EXAM
[General Appearance - Well Developed] : well developed [General Appearance - Well Nourished] : well nourished [Normal Appearance] : normal appearance [Well Groomed] : well groomed [Abdomen Soft] : soft [General Appearance - In No Acute Distress] : no acute distress [Abdomen Tenderness] : non-tender [Costovertebral Angle Tenderness] : no ~M costovertebral angle tenderness [Edema] : no peripheral edema [Respiration, Rhythm And Depth] : normal respiratory rhythm and effort [] : no respiratory distress [Exaggerated Use Of Accessory Muscles For Inspiration] : no accessory muscle use [Oriented To Time, Place, And Person] : oriented to person, place, and time [Affect] : the affect was normal [Not Anxious] : not anxious [Mood] : the mood was normal [Normal Station and Gait] : the gait and station were normal for the patient's age [No Palpable Adenopathy] : no palpable adenopathy [FreeTextEntry1] : incisions well healed

## 2019-09-16 NOTE — LETTER HEADER
[FreeTextEntry3] : Ward Meyer MD\par Director of Robotic and Minimally Invasive Urologic Surgery\par \par [facility]\par Division of Urology\par [address]\par [address]\[place] [address]\par Tel [phone]\par Fax [phone]\par \par

## 2019-09-16 NOTE — LETTER BODY
[Dear  ___] : Dear  [unfilled], [Consult Letter:] : I had the pleasure of evaluating your patient, [unfilled]. [Please see my note below.] : Please see my note below. [Consult Closing:] : Thank you very much for allowing me to participate in the care of this patient.  If you have any questions, please do not hesitate to contact me. [Sincerely,] : Sincerely, [DrSherman  ___] : Dr. PHAM [FreeTextEntry2] : Edmund Westfall MD\par [address] [place] [address]\[place] [address]\par  [FreeTextEntry3] : Ward Meyer MD\par Director of Robotic and Minimally Invasive Urologic Surgery\par [facility]\par

## 2019-11-11 ENCOUNTER — FORM ENCOUNTER (OUTPATIENT)
Age: 64
End: 2019-11-11

## 2019-11-12 ENCOUNTER — OUTPATIENT (OUTPATIENT)
Dept: OUTPATIENT SERVICES | Facility: HOSPITAL | Age: 64
LOS: 1 days | Discharge: HOME | End: 2019-11-12
Payer: COMMERCIAL

## 2019-11-12 DIAGNOSIS — R91.1 SOLITARY PULMONARY NODULE: ICD-10-CM

## 2019-11-12 DIAGNOSIS — Z90.89 ACQUIRED ABSENCE OF OTHER ORGANS: Chronic | ICD-10-CM

## 2019-11-12 DIAGNOSIS — Z98.890 OTHER SPECIFIED POSTPROCEDURAL STATES: Chronic | ICD-10-CM

## 2019-11-12 PROCEDURE — 71250 CT THORAX DX C-: CPT | Mod: 26

## 2019-12-05 ENCOUNTER — OUTPATIENT (OUTPATIENT)
Dept: OUTPATIENT SERVICES | Facility: HOSPITAL | Age: 64
LOS: 1 days | Discharge: HOME | End: 2019-12-05

## 2019-12-05 ENCOUNTER — LABORATORY RESULT (OUTPATIENT)
Age: 64
End: 2019-12-05

## 2019-12-05 DIAGNOSIS — Z98.890 OTHER SPECIFIED POSTPROCEDURAL STATES: Chronic | ICD-10-CM

## 2019-12-05 DIAGNOSIS — N40.0 BENIGN PROSTATIC HYPERPLASIA WITHOUT LOWER URINARY TRACT SYMPTOMS: ICD-10-CM

## 2019-12-05 DIAGNOSIS — Z90.89 ACQUIRED ABSENCE OF OTHER ORGANS: Chronic | ICD-10-CM

## 2019-12-10 PROBLEM — R91.1 LUNG NODULE: Status: ACTIVE | Noted: 2019-12-10

## 2019-12-11 ENCOUNTER — APPOINTMENT (OUTPATIENT)
Dept: CARDIOTHORACIC SURGERY | Facility: CLINIC | Age: 64
End: 2019-12-11
Payer: COMMERCIAL

## 2019-12-11 VITALS
HEART RATE: 67 BPM | SYSTOLIC BLOOD PRESSURE: 142 MMHG | RESPIRATION RATE: 13 BRPM | BODY MASS INDEX: 27.3 KG/M2 | WEIGHT: 195 LBS | DIASTOLIC BLOOD PRESSURE: 83 MMHG | OXYGEN SATURATION: 97 % | HEIGHT: 71 IN | TEMPERATURE: 98 F

## 2019-12-11 DIAGNOSIS — R91.1 SOLITARY PULMONARY NODULE: ICD-10-CM

## 2019-12-11 PROCEDURE — 99213 OFFICE O/P EST LOW 20 MIN: CPT

## 2019-12-11 RX ORDER — ASPIRIN 81 MG/1
81 TABLET, COATED ORAL
Qty: 90 | Refills: 0 | Status: DISCONTINUED | COMMUNITY
Start: 2018-10-20 | End: 2019-12-11

## 2019-12-11 RX ORDER — CIPROFLOXACIN HYDROCHLORIDE 500 MG/1
500 TABLET, FILM COATED ORAL
Qty: 16 | Refills: 1 | Status: DISCONTINUED | COMMUNITY
Start: 2018-11-29 | End: 2019-12-11

## 2019-12-11 RX ORDER — OXYBUTYNIN CHLORIDE 5 MG/1
5 TABLET, EXTENDED RELEASE ORAL
Qty: 90 | Refills: 5 | Status: DISCONTINUED | COMMUNITY
Start: 2019-04-28 | End: 2019-12-11

## 2019-12-11 RX ORDER — TAMSULOSIN HYDROCHLORIDE 0.4 MG/1
0.4 CAPSULE ORAL
Qty: 30 | Refills: 5 | Status: DISCONTINUED | COMMUNITY
Start: 2019-05-01 | End: 2019-12-11

## 2019-12-11 RX ORDER — ASPIRIN 325 MG/1
TABLET, FILM COATED ORAL
Refills: 0 | Status: DISCONTINUED | COMMUNITY
End: 2019-12-11

## 2019-12-11 NOTE — PHYSICAL EXAM
[] : no respiratory distress [Auscultation Breath Sounds / Voice Sounds] : lungs were clear to auscultation bilaterally [Heart Rate And Rhythm] : heart rate was normal and rhythm regular [Heart Sounds Gallop] : no gallops [Heart Sounds] : normal S1 and S2 [Murmurs] : no murmurs [Heart Sounds Pericardial Friction Rub] : no pericardial rub [Abnormal Walk] : normal gait [Nail Clubbing] : no clubbing  or cyanosis of the fingernails [Musculoskeletal - Swelling] : no joint swelling seen [Motor Tone] : muscle strength and tone were normal [Sensation] : the sensory exam was normal to light touch and pinprick [Deep Tendon Reflexes (DTR)] : deep tendon reflexes were 2+ and symmetric [No Focal Deficits] : no focal deficits [Oriented To Time, Place, And Person] : oriented to person, place, and time [Impaired Insight] : insight and judgment were intact [Affect] : the affect was normal

## 2019-12-12 NOTE — REASON FOR VISIT
[Follow-Up: _____] : a [unfilled] follow-up visit [Spouse] : spouse [FreeTextEntry1] : Left upper lobe nodule

## 2019-12-12 NOTE — HISTORY OF PRESENT ILLNESS
[FreeTextEntry1] : Mr. DANILO GREGORY is a 64 year M who presents to our office today for a followup visit for a lung nodule. His PMH is significant for dilated ascending aorta, BPH, HTN, asthma, and repair of retinal detachment. He reports doing well overall. \par \par ECOG/WHO/Zubrod - 0 - Fully active, able to carry out all pre-disease performance without restrictions\par \par His healthcare team is as follows:\par PMD: Edmund Strange\par Cardio: none\par Pulm: Edmund Maniatis\par Endocrine: Skinny Tee (for elevated calcium)\par Uro: Saglovich\par

## 2019-12-12 NOTE — DATA REVIEWED
[FreeTextEntry1] : \par EXAM: CT CHEST -  11/12/2019 \par \par INTERPRETATION: Reason for Exam: Lung nodules. \par CT of the chest was performed from the thoracic inlet to the level of the adrenal glands without contrast injection. Coronal and sagittal images have been submitted. \par \par Comparison: CT scan dated July 24, 2018, October 19, 2015. \par \par Findings: \par \par Tubes/Lines: None. \par \par Lungs, Pleura, and Airways:3 mm pleural-based lymph node, left lung, series 4 image #148, by definition benign. \par \par Left basilar fibroatelectasis. \par \par Right lower lobe pulmonary nodule, calcified granuloma. \par \par Mediastinum/Lymph Nodes:There are no axillary, hilar, or mediastinal lymph nodes. \par \par Heart/Great Vessels: The mid ascending aorta measures 4.6 x 4.4 cm. At the level of the aortic arch, 3.8 cm. There is no pericardial effusion. \par \par Abdomen: Small gallstones are seen within the gallbladder. \par \par Bones and soft tissues: Spondylosis of the thoracic spine. \par \par IMPRESSION: \par \par 4.6 cm ascending thoracic aortic aneurysm without change. \par \par No suspicious-appearing pulmonary nodules.

## 2019-12-12 NOTE — ASSESSMENT
[FreeTextEntry1] : Mr. DANILO GREGORY is a 64 year M who presents to our office today for a followup visit for a lung nodule. His PMH is significant for dilated ascending aorta, BPH, HTN, asthma, and repair of retinal detachment. Radiologic testing reviewed. He reports doing well overall. As his lung nodule has remained grossly unchanged from the last year, and actually appear to appear stable at least back to the 2017 scan as well.  He does not meet criteria for any further followup of these lung nodules given size, risk factors, and stability over 2 years.  I will likely transition his care/surveillance of aortic aneurysm to one of my partners as he no longer has need for surveillance for both lung nodules and the aorta.  At present he does not meet criteria for any repair by size criteria and the aorta has remained stable in size since initiation of surveillance. 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

## 2019-12-16 ENCOUNTER — APPOINTMENT (OUTPATIENT)
Dept: UROLOGY | Facility: CLINIC | Age: 64
End: 2019-12-16
Payer: COMMERCIAL

## 2019-12-16 PROCEDURE — 99214 OFFICE O/P EST MOD 30 MIN: CPT

## 2019-12-16 NOTE — LETTER HEADER
[FreeTextEntry3] : Ward Meyer MD\par Director of Robotic and Minimally Invasive Urologic Surgery\par \par [facility]\par Division of Urology\par [address]\par [address]\[place] [address]\par Tel [phone]\par Fax [phone]\par \par

## 2019-12-16 NOTE — HISTORY OF PRESENT ILLNESS
[FreeTextEntry1] : 64M hx high risk PCa on pnbx sp RALP/ePLND 4/17/19 pathology Gl 4+3 (t5) PCa, neg margins, 0/14 LN neg.\par Downgraded on final path.\par \par Patient reports improving urinary incontinence using 5ppd from 15 pads per day. \par Reports stronger force of stream after stopping anticholinergic.\par Has not tried anticholinergic.\par Postoperatively he was actually hypercontinent and had elevated postvoid residuals for which we started Flomax. \par \par PSA 0.0512/5/19\par PSA 0.05 9/6/19\par Post op PSA 0.03 5/28/19

## 2019-12-16 NOTE — LETTER BODY
[Dear  ___] : Dear  [unfilled], [Consult Letter:] : I had the pleasure of evaluating your patient, [unfilled]. [Please see my note below.] : Please see my note below. [Consult Closing:] : Thank you very much for allowing me to participate in the care of this patient.  If you have any questions, please do not hesitate to contact me. [Sincerely,] : Sincerely, [FreeTextEntry2] : Edmund Westfall MD\par [address] [place] [address]\[place] [address]\par  [FreeTextEntry3] : Ward Meyer MD\par Director of Robotic and Minimally Invasive Urologic Surgery\par [facility]\par  [DrSherman  ___] : Dr. PHAM

## 2019-12-16 NOTE — PHYSICAL EXAM
[General Appearance - Well Developed] : well developed [Normal Appearance] : normal appearance [General Appearance - Well Nourished] : well nourished [General Appearance - In No Acute Distress] : no acute distress [Well Groomed] : well groomed [Abdomen Soft] : soft [Abdomen Tenderness] : non-tender [Costovertebral Angle Tenderness] : no ~M costovertebral angle tenderness [FreeTextEntry1] : incisions well healed [Edema] : no peripheral edema [] : no rash [Respiration, Rhythm And Depth] : normal respiratory rhythm and effort [Exaggerated Use Of Accessory Muscles For Inspiration] : no accessory muscle use [Oriented To Time, Place, And Person] : oriented to person, place, and time [Affect] : the affect was normal [Mood] : the mood was normal [Not Anxious] : not anxious [No Palpable Adenopathy] : no palpable adenopathy [Normal Station and Gait] : the gait and station were normal for the patient's age

## 2020-03-23 LAB — PSA, POST - PROSTATECTOMY: 0.05 NG/ML

## 2020-04-07 ENCOUNTER — APPOINTMENT (OUTPATIENT)
Dept: UROLOGY | Facility: CLINIC | Age: 65
End: 2020-04-07
Payer: COMMERCIAL

## 2020-04-07 PROCEDURE — 99215 OFFICE O/P EST HI 40 MIN: CPT

## 2020-04-07 NOTE — HISTORY OF PRESENT ILLNESS
[Home] : at home, [unfilled] , at the time of the visit. [Other Location: e.g. Home (Enter Location, City,State)___] : at [unfilled] [Patient] : the patient [Self] : self [FreeTextEntry1] : 64M hx high risk PCa on pnbx sp RALP/ePLND 4/17/19 pathology Gl 4+3 (t5) PCa, neg margins, 0/14 LN neg, preop had significant BPH/LUTS.\par Downgraded on final path.\par \par Patient reports improving urinary incontinence now using 2-4 ppd from 5 ppd, not completely soaked pads.\par States has very rare LASHELL w stress maneuvers. Only urge incontinence with what sounds like a bladder spasm. Frequency, urgency improved now can hold up to 2 hours.\par Reports strong force of stream.\par Nocturia now 1x from 4x preop.\par Postoperatively he was actually hypercontinent and had elevated postvoid residuals for which we started Flomax. \par \par Still w ED, hasn’t tried sildenafil.\par \par PSA 0.05 3/18/19\par PSA 0.0512/5/19\par PSA 0.05 9/6/19\par Post op PSA 0.03 5/28/19

## 2020-04-07 NOTE — END OF VISIT
[FreeTextEntry3] : The patient-doctor relationship has been established in a face to face fashion via real time video/audio HIPAA compliant communication using telemedicine software. The patient's identity has been confirmed. The patient was previously emailed a copy of the telemedicine consent. They have had a chance to review and has now given verbal consent and has requested care to be assessed and treated through telemedicine and understands there may be limitations in this process and they may need further follow up care in the office and or hospital settings.\par  [>50% of Time Spent on Counseling and Coordination of Care for  ___] : Greater than 50% of the encounter time was spent on counseling and coordination of care for [unfilled] [Time Spent: ___ minutes] : I have spent [unfilled] minutes of face to face time with the patient

## 2020-04-07 NOTE — ASSESSMENT
[FreeTextEntry1] : 64M hx high risk PCa on pnbx sp RALP/ePLND 4/17/19 pathology Gl 4+3 (t5) PCa, neg margins, 0/14 LN neg, preop had significant BPH/LUTS.\par Downgraded on final path.\par With improving OAB/UUI.\par Declines mirabegron at this time since he has seen such improvement in recent months however will consider in future. I believe this would significantly improve pts symptoms.\par Sildenafil, future hewitt referral\par fu 3 mo PSA prior

## 2020-04-07 NOTE — PHYSICAL EXAM
[General Appearance - Well Developed] : well developed [General Appearance - Well Nourished] : well nourished [] : no respiratory distress [Exaggerated Use Of Accessory Muscles For Inspiration] : no accessory muscle use

## 2020-04-09 ENCOUNTER — APPOINTMENT (OUTPATIENT)
Dept: UROLOGY | Facility: CLINIC | Age: 65
End: 2020-04-09

## 2020-07-28 ENCOUNTER — APPOINTMENT (OUTPATIENT)
Dept: UROLOGY | Facility: CLINIC | Age: 65
End: 2020-07-28

## 2020-08-12 LAB — PSA, POST - PROSTATECTOMY: 0.06 NG/ML

## 2020-09-03 ENCOUNTER — APPOINTMENT (OUTPATIENT)
Dept: UROLOGY | Facility: CLINIC | Age: 65
End: 2020-09-03
Payer: COMMERCIAL

## 2020-09-03 PROCEDURE — 99214 OFFICE O/P EST MOD 30 MIN: CPT

## 2020-09-03 NOTE — ASSESSMENT
[FreeTextEntry1] : 64M hx high risk PCa on pnbx sp RALP/ePLND 4/17/19 pathology Gl 4+3 (t5) PCa, neg margins, 0/14 LN neg, preop had significant BPH/LUTS.\par Downgraded on final path.\par \par With improving OAB/UUI.\par For occasional bouts of UUI recommend mirabegron \par Cialis prn ED\par PSA in 6 mo\par

## 2020-09-03 NOTE — PHYSICAL EXAM
[General Appearance - Well Developed] : well developed [General Appearance - Well Nourished] : well nourished [Exaggerated Use Of Accessory Muscles For Inspiration] : no accessory muscle use [Normal Appearance] : normal appearance [Well Groomed] : well groomed [General Appearance - In No Acute Distress] : no acute distress [Abdomen Soft] : soft [Abdomen Tenderness] : non-tender [Costovertebral Angle Tenderness] : no ~M costovertebral angle tenderness [Urethral Meatus] : meatus normal [Urinary Bladder Findings] : the bladder was normal on palpation [Scrotum] : the scrotum was normal [Testes Mass (___cm)] : there were no testicular masses [FreeTextEntry1] : no levator tenderness or spasm [] : no rash [Edema] : no peripheral edema [Respiration, Rhythm And Depth] : normal respiratory rhythm and effort [Oriented To Time, Place, And Person] : oriented to person, place, and time [Affect] : the affect was normal [Mood] : the mood was normal [Not Anxious] : not anxious [Normal Station and Gait] : the gait and station were normal for the patient's age [No Focal Deficits] : no focal deficits [No Palpable Adenopathy] : no palpable adenopathy

## 2020-09-03 NOTE — HISTORY OF PRESENT ILLNESS
[FreeTextEntry1] : 64M hx high risk PCa on pnbx sp RALP/ePLND 4/17/19 pathology Gl 4+3 (t5) PCa, neg margins, 0/14 LN neg, preop had significant BPH/LUTS.\par Downgraded on final path.\par \par Patient reports improving urinary incontinence now 2 ppd typically (one at night one in evening) however occasionally having "bad days" where he uses more pads, with sporadic bouts of obstructive voiding symptoms, sensation incomplete emptying, frequency. Typically with more activity such as heavy lifting he has less incontinence. Denies pelvic pain.\par Reports strong force of stream.\par Nocturia now 2x from 4x preop.\par Postoperatively he was actually hypercontinent and had elevated postvoid residuals for which we started Flomax. \par Still w ED, but has partial erection w sildenafil, side effect headache. Preop had some ED. Better success w cialis\par \par PSA 0.06 8/10/2020\par PSA 0.05 3/18/19\par PSA 0.0512/5/19\par PSA 0.05 9/6/19\par Post op PSA 0.03 5/28/19

## 2020-11-23 ENCOUNTER — NON-APPOINTMENT (OUTPATIENT)
Age: 65
End: 2020-11-23

## 2020-12-14 ENCOUNTER — OUTPATIENT (OUTPATIENT)
Dept: OUTPATIENT SERVICES | Facility: HOSPITAL | Age: 65
LOS: 1 days | Discharge: HOME | End: 2020-12-14
Payer: COMMERCIAL

## 2020-12-14 ENCOUNTER — RESULT REVIEW (OUTPATIENT)
Age: 65
End: 2020-12-14

## 2020-12-14 DIAGNOSIS — Z90.89 ACQUIRED ABSENCE OF OTHER ORGANS: Chronic | ICD-10-CM

## 2020-12-14 DIAGNOSIS — I71.2 THORACIC AORTIC ANEURYSM, WITHOUT RUPTURE: ICD-10-CM

## 2020-12-14 DIAGNOSIS — Z98.890 OTHER SPECIFIED POSTPROCEDURAL STATES: Chronic | ICD-10-CM

## 2020-12-14 PROCEDURE — 71250 CT THORAX DX C-: CPT | Mod: 26

## 2021-01-19 ENCOUNTER — APPOINTMENT (OUTPATIENT)
Dept: CARDIOTHORACIC SURGERY | Facility: CLINIC | Age: 66
End: 2021-01-19
Payer: COMMERCIAL

## 2021-01-19 PROCEDURE — 99441: CPT

## 2021-01-19 NOTE — ASSESSMENT
[FreeTextEntry1] : Mr. Smith has no change in his aneurysm size. He will continue follow up with his PMD, and health care team. Patient was educated on the presentation of the aneurysm, as well as signs and symptoms of rupture. Patient was instructed to maintain low salt diet, as well as maintain blood pressure parameters less than 130 mm hg systolic as well as a heart rate less than 100 bpm.  Plan will be to repeat a CT of the chest in 1 year, non-contrast. Letter to be written for his FAA. \par \par

## 2021-01-19 NOTE — DATA REVIEWED
[FreeTextEntry1] :  EXAM:  CT CHEST\par \par PROCEDURE DATE:  12/14/2020\par \par INTERPRETATION:  CLINICAL INDICATION: Follow up aortic aneurysm. History of asthma, prostate cancer and prior pneumonia.\par \par TECHNIQUE:  A noncontrast CT of the thorax was performed. Sagittal and coronal reformats were performed as well as MIP reconstructions.\par \par COMPARISON: CT chest dated 11/12/2019.\par \par INTERPRETATION:\par \par AIRWAYS, LUNGS AND PLEURA: The central tracheobronchial tree is patent.  Multiple right upper lobe groundglass opacities (series 5, image 59, 66, 81). Scarring/atelectasis of the bilateral lower lobes. No suspicious nodules. There is no pleural effusion. There is no pneumothorax.\par \par MEDIASTINUM: There are no enlarged mediastinal, hilar or axillary lymph nodes. The visualized portion of the thyroid gland is unremarkable.\par \par HEART AND VESSELS: The heart is normal in size. There are trace coronary artery and aortic calcifications. The ascending aorta is dilated to 46 mm, unchanged. There is no pericardial effusion.\par \par UPPER ABDOMEN: Images of the upper abdomen demonstrate partially visualized gallstones.\par \par BONES AND SOFT TISSUES: There are degenerative changes of the spine.  The soft tissues are unremarkable.\par \par TUBES AND LINES: None.\par \par IMPRESSION:\par Since November 12, 2019;\par 1.  Unchanged 4.6 cm ascending aortic aneurysm.\par 2.  No suspicious pulmonary nodules.\par 3.  Nonspecific right upper lobe groundglass opacities, likely post infectious/inflammatory in nature. Improved.

## 2021-01-19 NOTE — HISTORY OF PRESENT ILLNESS
[Home] : at home, [unfilled] , at the time of the visit. [Medical Office: ([facility])___] : at the medical office located in  [Verbal consent obtained from patient] : the patient, [unfilled] [FreeTextEntry1] : Mr. DANILO GREGORY is a 65 year M who is called today for a follow up for an ascending aortic aneurysm. His PMH is dilated ascending aorta, BPH, HTN, asthma, and repair of retinal detachment. He reports doing well overall. No changes in his status since last he had visited the office. \par \par \par His healthcare team is as follows:\par PMD: Edmund Strange\par Cardio: none\par Pulm: Edmund Maniatis\par Endocrine: Skinny Oliveira (for elevated calcium)\par Uro: Saglovich\par

## 2021-02-27 NOTE — H&P PST ADULT - GASTROINTESTINAL
Soft, non-tender, no hepatosplenomegaly, normal bowel sounds Alert and oriented, no focal deficits, no motor or sensory deficits.

## 2021-03-16 ENCOUNTER — APPOINTMENT (OUTPATIENT)
Dept: UROLOGY | Facility: CLINIC | Age: 66
End: 2021-03-16
Payer: COMMERCIAL

## 2021-03-16 DIAGNOSIS — N13.8 BENIGN PROSTATIC HYPERPLASIA WITH LOWER URINARY TRACT SYMPMS: ICD-10-CM

## 2021-03-16 DIAGNOSIS — N35.819 OTHER URETHRAL STRICTURE, MALE, UNSPECIFIED SITE: ICD-10-CM

## 2021-03-16 DIAGNOSIS — N40.1 BENIGN PROSTATIC HYPERPLASIA WITH LOWER URINARY TRACT SYMPMS: ICD-10-CM

## 2021-03-16 PROCEDURE — 99214 OFFICE O/P EST MOD 30 MIN: CPT | Mod: 25

## 2021-03-16 PROCEDURE — 99072 ADDL SUPL MATRL&STAF TM PHE: CPT

## 2021-03-16 PROCEDURE — 52000 CYSTOURETHROSCOPY: CPT

## 2021-03-19 LAB — PSA, POST - PROSTATECTOMY: 0.19 NG/ML

## 2021-03-25 ENCOUNTER — APPOINTMENT (OUTPATIENT)
Dept: UROLOGY | Facility: CLINIC | Age: 66
End: 2021-03-25
Payer: COMMERCIAL

## 2021-03-25 DIAGNOSIS — N52.9 MALE ERECTILE DYSFUNCTION, UNSPECIFIED: ICD-10-CM

## 2021-03-25 PROCEDURE — 99214 OFFICE O/P EST MOD 30 MIN: CPT | Mod: 95

## 2021-03-25 NOTE — HISTORY OF PRESENT ILLNESS
[FreeTextEntry1] : 64M hx high risk PCa on pnbx sp RALP/ePLND (4/17/19) pathology Gl 4+3 (t5) PCa, neg margins, 0/14 LN neg, preop had significant BPH/LUTS.\par \par Pt reports improved split stream since the procedure. 2 ppd as per last visit including one day one night, LASHELL. improving ED last visit.\par \par Cysto with mildly narrow caliber penile urethra, very good coaptation of external sphincter. at proximal bulbar urethra, just distal to sphincter there is a white thin band of fibrosis running from 12 to 6 oclock (another small band lateral to it was opened with scope) however urethra wide open on each side of the band, scope easily inserted. \par \par Urine Dip 3/16/2021: pH: 7.0 Saqib= negative, Nitr= negative. \par 12/2021\par A1C : 5.9%  // Protein=negative // blood=negative // pH= 6 // PSA= 0.15 // creat= 1.1 // BUN=23 //\par \par PSA 0.19 3/2021\par PSA 0.15 12/2020\par PSA 0.06 8/10/2020\par PSA 0.05 3/18/19\par PSA 0.0512/5/19\par PSA 0.05 9/6/19\par Post op PSA 0.03 5/28/19\par \par Previously: \par Postoperatively he was actually hypercontinent and had elevated postvoid residuals for which we started Flomax. \par Still w ED, but has partial erection w sildenafil, side effect headache. Preop had some ED. Better success w cialis\par

## 2021-03-25 NOTE — ASSESSMENT
[FreeTextEntry1] : 64M hx high risk PCa on pnbx sp RALP/ePLND (4/17/19) pathology Gl 4+3 (t5) PCa, neg margins, 0/14 LN neg, \par preop had significant BPH/LUTS. Now with BCR\par \par -Discussed case w Dr Funez our urologic oncologist and agree w him that local failure is unlikely here given patients pathology.\par Recommend axumin r/o metastatic disease \par -f/u telemed in 1 week \par -of note pt may be moving to [place] this summer\par

## 2021-04-27 LAB
ANION GAP SERPL CALC-SCNC: 12 MMOL/L
BUN SERPL-MCNC: 20 MG/DL
CALCIUM SERPL-MCNC: 11.7 MG/DL
CHLORIDE SERPL-SCNC: 105 MMOL/L
CO2 SERPL-SCNC: 25 MMOL/L
CREAT SERPL-MCNC: 1.1 MG/DL
GLUCOSE SERPL-MCNC: 106 MG/DL
POTASSIUM SERPL-SCNC: 4.9 MMOL/L
SODIUM SERPL-SCNC: 142 MMOL/L

## 2021-05-08 ENCOUNTER — RESULT REVIEW (OUTPATIENT)
Age: 66
End: 2021-05-08

## 2021-05-08 ENCOUNTER — OUTPATIENT (OUTPATIENT)
Dept: OUTPATIENT SERVICES | Facility: HOSPITAL | Age: 66
LOS: 1 days | Discharge: HOME | End: 2021-05-08
Payer: COMMERCIAL

## 2021-05-08 DIAGNOSIS — Z90.89 ACQUIRED ABSENCE OF OTHER ORGANS: Chronic | ICD-10-CM

## 2021-05-08 DIAGNOSIS — Z98.890 OTHER SPECIFIED POSTPROCEDURAL STATES: Chronic | ICD-10-CM

## 2021-05-08 DIAGNOSIS — C61 MALIGNANT NEOPLASM OF PROSTATE: ICD-10-CM

## 2021-05-08 PROCEDURE — 74177 CT ABD & PELVIS W/CONTRAST: CPT | Mod: 26

## 2021-05-08 PROCEDURE — 71260 CT THORAX DX C+: CPT | Mod: 26

## 2021-05-25 ENCOUNTER — APPOINTMENT (OUTPATIENT)
Dept: UROLOGY | Facility: CLINIC | Age: 66
End: 2021-05-25
Payer: COMMERCIAL

## 2021-05-25 DIAGNOSIS — N39.3 STRESS INCONTINENCE (FEMALE) (MALE): ICD-10-CM

## 2021-05-25 DIAGNOSIS — R97.20 ELEVATED PROSTATE, SPECIFIC ANTIGEN [PSA]: ICD-10-CM

## 2021-05-25 PROCEDURE — 99214 OFFICE O/P EST MOD 30 MIN: CPT | Mod: 95

## 2021-05-25 NOTE — HISTORY OF PRESENT ILLNESS
[FreeTextEntry1] : 64M hx high risk PCa on pnbx sp RALP/ePLND (4/17/19) pathology Gl 4+3 (t5) PCa, neg margins, 0/14 LN neg, preop had significant BPH/LUTS.\par \par \par Still using 2 ppd. having combination of urinary frequency q hour and LASHELL.  Regarding his hypercalcemia he saw his endocrinologist who believes that this is likely occult hyperparathyroidism which she has been worked up for many years.  At this time he is electing for observation\par CT abdomen pelvis images visualized demonstrating no hydronephrosis bilaterally no evidence of metastatic disease normal kidneys bladder unremarkable\par \par Previously Cysto with mildly narrow caliber penile urethra, very good coaptation of external sphincter. at proximal bulbar urethra, just distal to sphincter there is a white thin band of fibrosis running from 12 to 6 oclock (another small band lateral to it was opened with scope) however urethra wide open on each side of the band, scope easily inserted. \par \par Urine Dip 3/16/2021: pH: 7.0 Saqib= negative, Nitr= negative. \par 12/2021\par A1C : 5.9%  // Protein=negative // blood=negative // pH= 6 // PSA= 0.15 // creat= 1.1 // BUN=23 //\par \par PSA 0.19 3/2021\par PSA 0.15 12/2020\par PSA 0.06 8/10/2020\par PSA 0.05 3/18/19\par PSA 0.0512/5/19\par PSA 0.05 9/6/19\par Post op PSA 0.03 5/28/19\par \par Previously: \par Postoperatively he was actually hypercontinent and had elevated postvoid residuals for which we started Flomax. \par Still w ED, but has partial erection w sildenafil, side effect headache. Preop had some ED. Better success w cialis\par

## 2021-05-25 NOTE — ASSESSMENT
[FreeTextEntry1] : 64M hx high risk PCa on pnbx sp RALP/ePLND (4/17/19) pathology Gl 4+3 (t5) PCa, neg margins, 0/14 LN neg, \par preop had significant BPH/LUTS. Now with BCR\par \par -Previously discussed case w urologic oncologist and agree w him that local failure is unlikely here given patients pathology.\par Consider axumin r/o metastatic disease in future\par -PSA 6/2021 will assess PSADT\par -videourodynamics to further elucidate LUTS, incontinence\par -of note pt may be moving to [place] this summer\par

## 2021-06-11 LAB — PSA, POST - PROSTATECTOMY: 0.2 NG/ML

## 2021-09-23 ENCOUNTER — APPOINTMENT (OUTPATIENT)
Dept: UROLOGY | Facility: CLINIC | Age: 66
End: 2021-09-23
Payer: COMMERCIAL

## 2021-09-23 VITALS
DIASTOLIC BLOOD PRESSURE: 79 MMHG | BODY MASS INDEX: 27.3 KG/M2 | RESPIRATION RATE: 17 BRPM | WEIGHT: 195 LBS | HEART RATE: 60 BPM | HEIGHT: 71 IN | TEMPERATURE: 97.5 F | SYSTOLIC BLOOD PRESSURE: 146 MMHG

## 2021-09-23 PROCEDURE — 99215 OFFICE O/P EST HI 40 MIN: CPT

## 2021-09-23 RX ORDER — TADALAFIL 20 MG/1
20 TABLET ORAL
Qty: 30 | Refills: 5 | Status: COMPLETED | COMMUNITY
Start: 2020-09-03 | End: 2021-09-23

## 2021-09-23 RX ORDER — LISINOPRIL 10 MG/1
10 TABLET ORAL
Refills: 0 | Status: ACTIVE | COMMUNITY

## 2021-09-23 RX ORDER — TERAZOSIN 10 MG/1
10 CAPSULE ORAL
Qty: 30 | Refills: 0 | Status: COMPLETED | COMMUNITY
Start: 2019-01-03 | End: 2021-09-23

## 2021-09-23 RX ORDER — CIPROFLOXACIN HYDROCHLORIDE 500 MG/1
500 TABLET, FILM COATED ORAL TWICE DAILY
Qty: 2 | Refills: 0 | Status: COMPLETED | COMMUNITY
Start: 2021-03-16 | End: 2021-09-23

## 2021-09-23 RX ORDER — MIRABEGRON 25 MG/1
25 TABLET, FILM COATED, EXTENDED RELEASE ORAL
Qty: 30 | Refills: 3 | Status: COMPLETED | COMMUNITY
Start: 2020-09-03 | End: 2021-09-23

## 2021-09-23 RX ORDER — SILDENAFIL 100 MG/1
100 TABLET, FILM COATED ORAL
Qty: 10 | Refills: 11 | Status: COMPLETED | COMMUNITY
Start: 2019-09-16 | End: 2021-09-23

## 2021-09-23 NOTE — LETTER BODY
[Dear  ___] : Dear  [unfilled], [Consult Letter:] : I had the pleasure of evaluating your patient, [unfilled]. [Please see my note below.] : Please see my note below. [Consult Closing:] : Thank you very much for allowing me to participate in the care of this patient.  If you have any questions, please do not hesitate to contact me. [Sincerely,] : Sincerely, [FreeTextEntry1] : We had a very productive visit and discussed his urinary urgency incontinence.\par He has convinced me that he does not have any stress urinary incontinence.\par He is very motivated for treatment options and we discussed 3rd line OAB therapies.\par He was provided patient educational materials.\par He seems most interested in sacral neuromodulation with the rechargeable battery.\par He will call back my office when interested to schedule a peripheral nerve evaluation. [FreeTextEntry3] : Dr. Rodrick Jarvis\par Urology\par Voiding Dysfunction, Female Pelvic Medicine, & Reconstructive Surgery\par

## 2021-09-23 NOTE — HISTORY OF PRESENT ILLNESS
[FreeTextEntry1] : 66 yr old male patient with hx of prostate Ca. s/p 2019 Robotic-assisted radical prostatectomy presents to office today for complaint of urinary urge incontinence & urinary frequency x 2 years. He has tried myrbetriq in the past without any benefit. \par \par Pt have the need to void every 1-1.5hrs. patient states that he wears pad and requires to change 1-2x/day, the incontinence episodes increases during the evening. No need to strain to start voiding, strong to moderate stream. Denies LASHELL. Denies hematuria. Denies dysuria. \par \par Daily fluid intake: 2-3 cups regular coffee, 1L of water, 12 oz of juice or soda during lunch. \par Patient had experiment to eliminate fluid intake after certain hour at night, no improvement seen. \par \par Surgical Hx: Prostatectomy, April 2019 & Parathyroid surgery July 2021\par \par Social Hx: never smoker \par \par cysto was done March 2021 - very soft tissue that allowed scope to pass, unlikely to be a source of obstruction.  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  He is a  and spends time in [place], [place].\par \par he drove from [place] for today's encounter

## 2021-09-23 NOTE — PHYSICAL EXAM
[General Appearance - Well Developed] : well developed [Normal Appearance] : normal appearance [Abdomen Tenderness] : non-tender [Skin Color & Pigmentation] : normal skin color and pigmentation [] : no respiratory distress [Exaggerated Use Of Accessory Muscles For Inspiration] : no accessory muscle use [Affect] : the affect was normal [Not Anxious] : not anxious [Normal Station and Gait] : the gait and station were normal for the patient's age

## 2021-09-23 NOTE — ASSESSMENT
[FreeTextEntry1] : He strongly denies any stress urinary incontinence even with very strenuous activity\par \par He is most bothered by urinary urgency incontinence that is refractory to second line therapy. \par \par \par We discussed first line therapies for OAB, including dietary changes with reduction of bladder irritants such as caffeine/alcohol, addressing constipation, pelvic floor physical therapy.\par \par We also discussed third line therapies for OAB, including tibial nerve stimulation, intravesical bladder Botox, and sacral neuromodulation.\par \par \par he is most interested in SNM and the rechargeable battery\par provided with patient educational materials\par \par We discussed the r/b/a of Sacral Neuromodulation. The patient understands the procedure is done with an office PNE (peripheral nerve evaluation) or in 2 procedures or stages, and will only get the implanted battery if there is 50% improvement of the most bothersome symptom. The patient understands the non-rechargeable battery has an estimated life of 5-8 years, and the rechargeable battery has a life of 15 years but needs to be charged 1x/week for 20 minutes.  Risks including infection, bleeding, and rare allergy may lead to device removal. Need for revision surgery may be required.\par \par The patient understands that they won't be able to scuba dive with the device.\par \par Showed him the Pressglue and medtronic devices \par \par \par He will call back our office.

## 2021-10-26 ENCOUNTER — OUTPATIENT (OUTPATIENT)
Dept: OUTPATIENT SERVICES | Facility: HOSPITAL | Age: 66
LOS: 1 days | End: 2021-10-26
Payer: COMMERCIAL

## 2021-10-26 ENCOUNTER — APPOINTMENT (OUTPATIENT)
Dept: UROLOGY | Facility: CLINIC | Age: 66
End: 2021-10-26
Payer: COMMERCIAL

## 2021-10-26 VITALS — TEMPERATURE: 98.2 F | SYSTOLIC BLOOD PRESSURE: 144 MMHG | DIASTOLIC BLOOD PRESSURE: 77 MMHG | HEART RATE: 59 BPM

## 2021-10-26 DIAGNOSIS — N39.41 URGE INCONTINENCE: ICD-10-CM

## 2021-10-26 DIAGNOSIS — N32.81 OVERACTIVE BLADDER: ICD-10-CM

## 2021-10-26 DIAGNOSIS — R35.0 FREQUENCY OF MICTURITION: ICD-10-CM

## 2021-10-26 DIAGNOSIS — Z98.890 OTHER SPECIFIED POSTPROCEDURAL STATES: Chronic | ICD-10-CM

## 2021-10-26 DIAGNOSIS — Z90.89 ACQUIRED ABSENCE OF OTHER ORGANS: Chronic | ICD-10-CM

## 2021-10-26 PROCEDURE — 64561 IMPLANT NEUROELECTRODES: CPT | Mod: 50

## 2021-10-26 PROCEDURE — 76000 FLUOROSCOPY <1 HR PHYS/QHP: CPT

## 2021-10-26 PROCEDURE — C1897: CPT

## 2021-10-26 PROCEDURE — 95972 ALYS CPLX SP/PN NPGT W/PRGRM: CPT

## 2021-10-28 ENCOUNTER — OUTPATIENT (OUTPATIENT)
Dept: OUTPATIENT SERVICES | Facility: HOSPITAL | Age: 66
LOS: 1 days | End: 2021-10-28
Payer: COMMERCIAL

## 2021-10-28 ENCOUNTER — APPOINTMENT (OUTPATIENT)
Dept: UROLOGY | Facility: CLINIC | Age: 66
End: 2021-10-28
Payer: COMMERCIAL

## 2021-10-28 VITALS
BODY MASS INDEX: 27.3 KG/M2 | WEIGHT: 195 LBS | RESPIRATION RATE: 17 BRPM | HEIGHT: 71 IN | TEMPERATURE: 98.4 F | HEART RATE: 63 BPM | SYSTOLIC BLOOD PRESSURE: 135 MMHG | DIASTOLIC BLOOD PRESSURE: 82 MMHG

## 2021-10-28 VITALS
SYSTOLIC BLOOD PRESSURE: 116 MMHG | OXYGEN SATURATION: 96 % | DIASTOLIC BLOOD PRESSURE: 74 MMHG | HEART RATE: 63 BPM | WEIGHT: 195.99 LBS | TEMPERATURE: 97 F | HEIGHT: 71 IN | RESPIRATION RATE: 18 BRPM

## 2021-10-28 DIAGNOSIS — Z01.818 ENCOUNTER FOR OTHER PREPROCEDURAL EXAMINATION: ICD-10-CM

## 2021-10-28 DIAGNOSIS — Z98.890 OTHER SPECIFIED POSTPROCEDURAL STATES: Chronic | ICD-10-CM

## 2021-10-28 DIAGNOSIS — Z98.49 CATARACT EXTRACTION STATUS, UNSPECIFIED EYE: Chronic | ICD-10-CM

## 2021-10-28 DIAGNOSIS — I10 ESSENTIAL (PRIMARY) HYPERTENSION: ICD-10-CM

## 2021-10-28 DIAGNOSIS — J45.909 UNSPECIFIED ASTHMA, UNCOMPLICATED: ICD-10-CM

## 2021-10-28 DIAGNOSIS — I77.810 THORACIC AORTIC ECTASIA: ICD-10-CM

## 2021-10-28 DIAGNOSIS — N32.81 OVERACTIVE BLADDER: ICD-10-CM

## 2021-10-28 DIAGNOSIS — Z90.89 ACQUIRED ABSENCE OF OTHER ORGANS: Chronic | ICD-10-CM

## 2021-10-28 DIAGNOSIS — Z90.79 ACQUIRED ABSENCE OF OTHER GENITAL ORGAN(S): Chronic | ICD-10-CM

## 2021-10-28 DIAGNOSIS — N39.41 URGE INCONTINENCE: ICD-10-CM

## 2021-10-28 LAB
ANION GAP SERPL CALC-SCNC: 12 MMOL/L — SIGNIFICANT CHANGE UP (ref 5–17)
BLD GP AB SCN SERPL QL: NEGATIVE — SIGNIFICANT CHANGE UP
BUN SERPL-MCNC: 20 MG/DL — SIGNIFICANT CHANGE UP (ref 7–23)
CALCIUM SERPL-MCNC: 10 MG/DL — SIGNIFICANT CHANGE UP (ref 8.4–10.5)
CHLORIDE SERPL-SCNC: 103 MMOL/L — SIGNIFICANT CHANGE UP (ref 96–108)
CO2 SERPL-SCNC: 23 MMOL/L — SIGNIFICANT CHANGE UP (ref 22–31)
CREAT SERPL-MCNC: 1 MG/DL — SIGNIFICANT CHANGE UP (ref 0.5–1.3)
GLUCOSE SERPL-MCNC: 143 MG/DL — HIGH (ref 70–99)
HCT VFR BLD CALC: 44.2 % — SIGNIFICANT CHANGE UP (ref 39–50)
HGB BLD-MCNC: 14 G/DL — SIGNIFICANT CHANGE UP (ref 13–17)
MCHC RBC-ENTMCNC: 22.9 PG — LOW (ref 27–34)
MCHC RBC-ENTMCNC: 31.7 GM/DL — LOW (ref 32–36)
MCV RBC AUTO: 72.3 FL — LOW (ref 80–100)
NRBC # BLD: 0 /100 WBCS — SIGNIFICANT CHANGE UP (ref 0–0)
PLATELET # BLD AUTO: 312 K/UL — SIGNIFICANT CHANGE UP (ref 150–400)
POTASSIUM SERPL-MCNC: 4.5 MMOL/L — SIGNIFICANT CHANGE UP (ref 3.5–5.3)
POTASSIUM SERPL-SCNC: 4.5 MMOL/L — SIGNIFICANT CHANGE UP (ref 3.5–5.3)
RBC # BLD: 6.11 M/UL — HIGH (ref 4.2–5.8)
RBC # FLD: 14.9 % — HIGH (ref 10.3–14.5)
RH IG SCN BLD-IMP: POSITIVE — SIGNIFICANT CHANGE UP
SODIUM SERPL-SCNC: 138 MMOL/L — SIGNIFICANT CHANGE UP (ref 135–145)
WBC # BLD: 8.11 K/UL — SIGNIFICANT CHANGE UP (ref 3.8–10.5)
WBC # FLD AUTO: 8.11 K/UL — SIGNIFICANT CHANGE UP (ref 3.8–10.5)

## 2021-10-28 PROCEDURE — 86901 BLOOD TYPING SEROLOGIC RH(D): CPT

## 2021-10-28 PROCEDURE — 80048 BASIC METABOLIC PNL TOTAL CA: CPT

## 2021-10-28 PROCEDURE — 87640 STAPH A DNA AMP PROBE: CPT

## 2021-10-28 PROCEDURE — G0463: CPT

## 2021-10-28 PROCEDURE — 86900 BLOOD TYPING SEROLOGIC ABO: CPT

## 2021-10-28 PROCEDURE — 36415 COLL VENOUS BLD VENIPUNCTURE: CPT

## 2021-10-28 PROCEDURE — 99213 OFFICE O/P EST LOW 20 MIN: CPT

## 2021-10-28 PROCEDURE — 87641 MR-STAPH DNA AMP PROBE: CPT

## 2021-10-28 PROCEDURE — 86850 RBC ANTIBODY SCREEN: CPT

## 2021-10-28 PROCEDURE — 85027 COMPLETE CBC AUTOMATED: CPT

## 2021-10-28 RX ORDER — LIDOCAINE HCL 20 MG/ML
0.2 VIAL (ML) INJECTION ONCE
Refills: 0 | Status: DISCONTINUED | OUTPATIENT
Start: 2021-11-02 | End: 2021-11-02

## 2021-10-28 RX ORDER — CHLORHEXIDINE GLUCONATE 213 G/1000ML
1 SOLUTION TOPICAL ONCE
Refills: 0 | Status: DISCONTINUED | OUTPATIENT
Start: 2021-11-02 | End: 2021-11-02

## 2021-10-28 RX ORDER — ASPIRIN/CALCIUM CARB/MAGNESIUM 324 MG
1 TABLET ORAL
Qty: 0 | Refills: 0 | DISCHARGE

## 2021-10-28 RX ORDER — CEFAZOLIN SODIUM 1 G
2000 VIAL (EA) INJECTION ONCE
Refills: 0 | Status: DISCONTINUED | OUTPATIENT
Start: 2021-11-02 | End: 2021-11-16

## 2021-10-28 NOTE — H&P PST ADULT - NSICDXPASTMEDICALHX_GEN_ALL_CORE_FT
PAST MEDICAL HISTORY:  Asthma last attack 3-4 years ago  last used ventolin 3 weeks ago    Back pain     GERD (gastroesophageal reflux disease)     H/O detached retina repair left eye    High cholesterol     HTN (hypertension)     Prostate cancer     Serum calcium elevated      PAST MEDICAL HISTORY:  Asthma seasonal allergy asthma  last attack several years ago    Back pain     Dilated aortic root found on routine scan for few years, no changes since last scan   last echo within the year    GERD (gastroesophageal reflux disease)     H/O detached retina repair left eye    High cholesterol     [place] (hard of hearing)     HTN (hypertension)     Prostate cancer     Serum calcium elevated      PAST MEDICAL HISTORY:  Asthma seasonal allergy asthma  last attack several years ago    Back pain     Dilated aortic root found on routine scan for few years, no changes since last scan   last echo within the year    GERD (gastroesophageal reflux disease)     H/O detached retina repair left eye    High cholesterol     [place] (hard of hearing)     HTN (hypertension)     Prostate cancer     Serum calcium elevated     Urge incontinence

## 2021-10-28 NOTE — H&P PST ADULT - PROBLEM SELECTOR PLAN 1
scheduled for stage1 and 2 sacral neuromodulation lead and battery placement with axion device  preop instruction discussed include the use of chlorhexidine wash, patient verbalized understanding

## 2021-10-28 NOTE — END OF VISIT
[Time Spent: ___ minutes] : I have spent [unfilled] minutes of time on the encounter. [FreeTextEntry3] : Excellent response to PNE - patient ecstatic, will proceed with full Axonics implant on Tuesday in OR \par \par I, Dr. Jarvis, personally performed the evaluation and management (E/M) services for this established patient who presents today with (a) new problem(s)/exacerbation of existing conditio.  That E/M includes conducting the examination, assessing all new/exacerbated conditions, and establishing a new plan of care.  Today, my ACP, Vandana Garcia NP, was here to observe my evaluation and management services for this new problem/exacerbated condition to be followed going forward.\par

## 2021-10-28 NOTE — H&P PST ADULT - HISTORY OF PRESENT ILLNESS
66 year old male with h/o prostate cancer  66 year old male with h/o prostate cancer s/p robotic assisted radical prostatectomy 2019, c/o incontinence, using multiple pads, urgency, have had a successful SNS trial, presents today for preop testing for scheduled stage 1 and 2 sacral neuromodulation lead and battery placement axion device. His medical history also significant for HTN, hypercholesterolemia, dilated aortic root x many years (no changes from last scan per patient), last echo within the year, mild allergy induced asthma, occasional heartburn, [place].  Patient denies any s/s of covid-19 infection, no recent travel and no known exposure.  Patient lives in [place] and will call to schedule covid-19 PCR test at a [facility] facility, info given

## 2021-10-28 NOTE — HISTORY OF PRESENT ILLNESS
[FreeTextEntry1] : 66 yr old male patient presents to office today for lead removal. Patient had PNE 10/26/21. \par \par DTF  q3+ hours (was q1 hour)\par Pads decrease 0-2 (was 2-3)\par Amount void increase to 400cc from 100-150 cc \par UUI  0-1 (was 2+)\par \par He is very very happy with the results, although in last 24 hours less improvement \par \par Diary scanned \par

## 2021-10-28 NOTE — ASSESSMENT
[FreeTextEntry1] : Plan:\par \par - Lead removed today without any difficulty, no bleeding. \par \par - collected the remote from patient \par \par - Patient scheduled for surgery next Tuesday

## 2021-10-28 NOTE — H&P PST ADULT - NSICDXPASTSURGICALHX_GEN_ALL_CORE_FT
PAST SURGICAL HISTORY:  History of surgery right tibia/fibula    History of tonsillectomy      PAST SURGICAL HISTORY:  H/O cataract extraction adjustable implant 10/5 and 10/19  UV protection    History of endoscopy upper and bottom endoscopy    History of parathyroid surgery 7/2021    History of robot-assisted laparoscopic radical prostatectomy April 2019    History of surgery right tibia/fibula    History of tonsillectomy

## 2021-10-29 LAB
MRSA PCR RESULT.: SIGNIFICANT CHANGE UP
S AUREUS DNA NOSE QL NAA+PROBE: SIGNIFICANT CHANGE UP

## 2021-10-30 ENCOUNTER — OUTPATIENT (OUTPATIENT)
Dept: OUTPATIENT SERVICES | Facility: HOSPITAL | Age: 66
LOS: 1 days | Discharge: HOME | End: 2021-10-30

## 2021-10-30 DIAGNOSIS — Z98.890 OTHER SPECIFIED POSTPROCEDURAL STATES: Chronic | ICD-10-CM

## 2021-10-30 DIAGNOSIS — Z90.79 ACQUIRED ABSENCE OF OTHER GENITAL ORGAN(S): Chronic | ICD-10-CM

## 2021-10-30 DIAGNOSIS — Z90.89 ACQUIRED ABSENCE OF OTHER ORGANS: Chronic | ICD-10-CM

## 2021-10-30 DIAGNOSIS — Z11.59 ENCOUNTER FOR SCREENING FOR OTHER VIRAL DISEASES: ICD-10-CM

## 2021-10-30 DIAGNOSIS — Z98.49 CATARACT EXTRACTION STATUS, UNSPECIFIED EYE: Chronic | ICD-10-CM

## 2021-10-30 PROBLEM — H91.90 UNSPECIFIED HEARING LOSS, UNSPECIFIED EAR: Chronic | Status: ACTIVE | Noted: 2021-10-28

## 2021-10-30 PROBLEM — J45.909 UNSPECIFIED ASTHMA, UNCOMPLICATED: Chronic | Status: ACTIVE | Noted: 2019-04-03

## 2021-10-30 PROBLEM — N39.41 URGE INCONTINENCE: Chronic | Status: ACTIVE | Noted: 2021-10-28

## 2021-10-30 PROBLEM — I77.810 THORACIC AORTIC ECTASIA: Chronic | Status: ACTIVE | Noted: 2021-10-28

## 2021-11-01 ENCOUNTER — TRANSCRIPTION ENCOUNTER (OUTPATIENT)
Age: 66
End: 2021-11-01

## 2021-11-02 ENCOUNTER — OUTPATIENT (OUTPATIENT)
Dept: OUTPATIENT SERVICES | Facility: HOSPITAL | Age: 66
LOS: 1 days | End: 2021-11-02
Payer: COMMERCIAL

## 2021-11-02 ENCOUNTER — APPOINTMENT (OUTPATIENT)
Dept: UROLOGY | Facility: HOSPITAL | Age: 66
End: 2021-11-02

## 2021-11-02 ENCOUNTER — TRANSCRIPTION ENCOUNTER (OUTPATIENT)
Age: 66
End: 2021-11-02

## 2021-11-02 VITALS
OXYGEN SATURATION: 99 % | SYSTOLIC BLOOD PRESSURE: 114 MMHG | DIASTOLIC BLOOD PRESSURE: 72 MMHG | TEMPERATURE: 98 F | HEART RATE: 55 BPM | RESPIRATION RATE: 17 BRPM

## 2021-11-02 VITALS
HEART RATE: 57 BPM | TEMPERATURE: 98 F | RESPIRATION RATE: 16 BRPM | DIASTOLIC BLOOD PRESSURE: 79 MMHG | SYSTOLIC BLOOD PRESSURE: 128 MMHG | OXYGEN SATURATION: 99 % | HEIGHT: 71 IN | WEIGHT: 195.99 LBS

## 2021-11-02 DIAGNOSIS — Z98.890 OTHER SPECIFIED POSTPROCEDURAL STATES: Chronic | ICD-10-CM

## 2021-11-02 DIAGNOSIS — N39.41 URGE INCONTINENCE: ICD-10-CM

## 2021-11-02 DIAGNOSIS — Z90.89 ACQUIRED ABSENCE OF OTHER ORGANS: Chronic | ICD-10-CM

## 2021-11-02 DIAGNOSIS — Z90.79 ACQUIRED ABSENCE OF OTHER GENITAL ORGAN(S): Chronic | ICD-10-CM

## 2021-11-02 DIAGNOSIS — Z98.49 CATARACT EXTRACTION STATUS, UNSPECIFIED EYE: Chronic | ICD-10-CM

## 2021-11-02 DIAGNOSIS — N32.81 OVERACTIVE BLADDER: ICD-10-CM

## 2021-11-02 LAB — RH IG SCN BLD-IMP: POSITIVE — SIGNIFICANT CHANGE UP

## 2021-11-02 PROCEDURE — 64590 INS/RPL PRPH SAC/GSTR NPG/R: CPT

## 2021-11-02 PROCEDURE — 64561 IMPLANT NEUROELECTRODES: CPT

## 2021-11-02 PROCEDURE — 76000 FLUOROSCOPY <1 HR PHYS/QHP: CPT | Mod: 26,59

## 2021-11-02 PROCEDURE — C1778: CPT

## 2021-11-02 PROCEDURE — C1894: CPT

## 2021-11-02 PROCEDURE — 76000 FLUOROSCOPY <1 HR PHYS/QHP: CPT

## 2021-11-02 PROCEDURE — 64581 OPN IMPLTJ NEA SACRAL NERVE: CPT

## 2021-11-02 RX ORDER — OXYCODONE HYDROCHLORIDE 5 MG/1
1 TABLET ORAL
Qty: 5 | Refills: 0
Start: 2021-11-02

## 2021-11-02 RX ORDER — SODIUM CHLORIDE 9 MG/ML
3 INJECTION INTRAMUSCULAR; INTRAVENOUS; SUBCUTANEOUS EVERY 8 HOURS
Refills: 0 | Status: DISCONTINUED | OUTPATIENT
Start: 2021-11-02 | End: 2021-11-02

## 2021-11-02 RX ORDER — SODIUM CHLORIDE 9 MG/ML
1000 INJECTION, SOLUTION INTRAVENOUS
Refills: 0 | Status: DISCONTINUED | OUTPATIENT
Start: 2021-11-02 | End: 2021-11-02

## 2021-11-02 RX ORDER — FENTANYL CITRATE 50 UG/ML
25 INJECTION INTRAVENOUS
Refills: 0 | Status: DISCONTINUED | OUTPATIENT
Start: 2021-11-02 | End: 2021-11-02

## 2021-11-02 RX ORDER — ONDANSETRON 8 MG/1
4 TABLET, FILM COATED ORAL ONCE
Refills: 0 | Status: DISCONTINUED | OUTPATIENT
Start: 2021-11-02 | End: 2021-11-02

## 2021-11-02 RX ORDER — CEPHALEXIN 500 MG
1 CAPSULE ORAL
Qty: 9 | Refills: 0
Start: 2021-11-02 | End: 2021-11-04

## 2021-11-02 NOTE — ASU DISCHARGE PLAN (ADULT/PEDIATRIC) - ASU DC SPECIAL INSTRUCTIONSFT
Take over the counter tylenol (500-1000mg) every 6 hours for 3 days. Oxycodone has been prescribed for pain unrelieved by these meds.     Please complete course of antibiotics as prescribed.     Please keep incisions clean and dry. Please do not Scrub or rub incisions. Do not use lotion or powder on incisions. Please do not submerge wound underwater. You may shower and/or sponge bathe starting on Thursday.    Please followup with Dr. Jarvis in 2 weeks. Please call to schedule appointment.

## 2021-11-02 NOTE — ASU PATIENT PROFILE, ADULT - NSICDXPASTMEDICALHX_GEN_ALL_CORE_FT
PAST MEDICAL HISTORY:  Asthma seasonal allergy asthma  last attack several years ago    Back pain     Dilated aortic root found on routine scan for few years, no changes since last scan   last echo within the year    GERD (gastroesophageal reflux disease)     H/O detached retina repair left eye    High cholesterol     [place] (hard of hearing)     HTN (hypertension)     Prostate cancer     Serum calcium elevated     Urge incontinence

## 2021-11-02 NOTE — ASU DISCHARGE PLAN (ADULT/PEDIATRIC) - CARE PROVIDER_API CALL
Rodrick Jarvis)  Urology  [address] [place] [address]  Phone: [phone]  Fax: [phone]  Follow Up Time:

## 2021-11-02 NOTE — BRIEF OPERATIVE NOTE - OPERATION/FINDINGS
Dx: overactive bladder  Procedure: axonis neuromodulation battery and lead insertion (stage 1 and 2)

## 2021-11-02 NOTE — ASU PATIENT PROFILE, ADULT - NSICDXPASTSURGICALHX_GEN_ALL_CORE_FT
PAST SURGICAL HISTORY:  H/O cataract extraction adjustable implant 10/5 and 10/19  UV protection    History of endoscopy upper and bottom endoscopy    History of parathyroid surgery 7/2021    History of robot-assisted laparoscopic radical prostatectomy April 2019    History of surgery right tibia/fibula    History of tonsillectomy

## 2021-11-02 NOTE — DISCHARGE NOTE NURSING/CASE MANAGEMENT/SOCIAL WORK - PATIENT PORTAL LINK FT
You can access the FollowMyHealth Patient Portal offered by [facility] by registering at the following website: [email or web address]. By joining [facility]’s FollowMyHealth portal, you will also be able to view your health information using other applications (apps) compatible with our system.

## 2021-11-10 DIAGNOSIS — I71.2 THORACIC AORTIC ANEURYSM, W/OUT RUPTURE: ICD-10-CM

## 2021-11-18 ENCOUNTER — APPOINTMENT (OUTPATIENT)
Dept: UROLOGY | Facility: CLINIC | Age: 66
End: 2021-11-18
Payer: COMMERCIAL

## 2021-11-18 VITALS
WEIGHT: 195 LBS | HEART RATE: 61 BPM | BODY MASS INDEX: 27.3 KG/M2 | SYSTOLIC BLOOD PRESSURE: 114 MMHG | DIASTOLIC BLOOD PRESSURE: 70 MMHG | TEMPERATURE: 98.6 F | HEIGHT: 71 IN | RESPIRATION RATE: 17 BRPM

## 2021-11-18 PROCEDURE — 99024 POSTOP FOLLOW-UP VISIT: CPT

## 2021-11-18 NOTE — HISTORY OF PRESENT ILLNESS
[FreeTextEntry1] : 66 yr old male patient presents to office today for post op follow up of SNM implant ([employer]) from 11/2/21, accompanied by Spouse.  Patient has been experiencing dramatic improvements in his nocturia daytime frequency and measured volumes of voids.\par \par Prior to the sacral neuromodulation he was experiencing nocturia 4-5 times and voiding at most 100 150 cc.  He can now wait up to 2 to 3 hours and is voiding closer to 300 cc.  He is also only waking up 1-2 times at night.  He is extremely sensitive to the program settings and does experience at higher settings the sensation but also increased post void dribbling that is bothersome and requiring more pads.\par \par Today in the presence of Radha from [employer] is settings were adjusted as well as the width and he was set to a new program.  He will be moving to [place] in the next few weeks and has requested all follow-up visits for telehealth.\par \par

## 2021-11-18 NOTE — PHYSICAL EXAM
[General Appearance - Well Developed] : well developed [General Appearance - Well Nourished] : well nourished [Normal Appearance] : normal appearance [Well Groomed] : well groomed [General Appearance - In No Acute Distress] : no acute distress [Abdomen Tenderness] : non-tender [Costovertebral Angle Tenderness] : no ~M costovertebral angle tenderness [FreeTextEntry1] : both back and gluteal incisions are well healed, palpable battery, slightly raised but not bothersome [Skin Color & Pigmentation] : normal skin color and pigmentation [Edema] : no peripheral edema [] : no respiratory distress [Respiration, Rhythm And Depth] : normal respiratory rhythm and effort [Exaggerated Use Of Accessory Muscles For Inspiration] : no accessory muscle use [Affect] : the affect was normal [Mood] : the mood was normal [Not Anxious] : not anxious [Normal Station and Gait] : the gait and station were normal for the patient's age

## 2021-11-22 ENCOUNTER — OUTPATIENT (OUTPATIENT)
Dept: OUTPATIENT SERVICES | Facility: HOSPITAL | Age: 66
LOS: 1 days | Discharge: HOME | End: 2021-11-22

## 2021-11-22 ENCOUNTER — TRANSCRIPTION ENCOUNTER (OUTPATIENT)
Age: 66
End: 2021-11-22

## 2021-11-22 VITALS — HEART RATE: 60 BPM | RESPIRATION RATE: 20 BRPM | DIASTOLIC BLOOD PRESSURE: 75 MMHG | SYSTOLIC BLOOD PRESSURE: 118 MMHG

## 2021-11-22 VITALS — HEIGHT: 71 IN | WEIGHT: 195.11 LBS

## 2021-11-22 DIAGNOSIS — Z98.49 CATARACT EXTRACTION STATUS, UNSPECIFIED EYE: Chronic | ICD-10-CM

## 2021-11-22 DIAGNOSIS — Z98.890 OTHER SPECIFIED POSTPROCEDURAL STATES: Chronic | ICD-10-CM

## 2021-11-22 DIAGNOSIS — Z90.89 ACQUIRED ABSENCE OF OTHER ORGANS: Chronic | ICD-10-CM

## 2021-11-22 DIAGNOSIS — Z90.79 ACQUIRED ABSENCE OF OTHER GENITAL ORGAN(S): Chronic | ICD-10-CM

## 2021-11-22 RX ORDER — LISINOPRIL 2.5 MG/1
1 TABLET ORAL
Qty: 0 | Refills: 0 | DISCHARGE

## 2021-11-22 RX ORDER — ALBUTEROL 90 UG/1
0 AEROSOL, METERED ORAL
Qty: 0 | Refills: 0 | DISCHARGE

## 2021-11-22 RX ORDER — FLUTICASONE PROPIONATE AND SALMETEROL 50; 250 UG/1; UG/1
0 POWDER ORAL; RESPIRATORY (INHALATION)
Qty: 0 | Refills: 0 | DISCHARGE

## 2021-11-22 RX ORDER — GEMFIBROZIL 600 MG
1 TABLET ORAL
Qty: 0 | Refills: 0 | DISCHARGE

## 2021-11-22 NOTE — ASU PREOP CHECKLIST - HEIGHT IN INCHES
Coronavirus (COVID-19) Notification  Your result for COVID-19 is Negative  Letter sent that will serve as a formal notice for your employer 11

## 2021-11-22 NOTE — ASU DISCHARGE PLAN (ADULT/PEDIATRIC) - PATIENT BELONGINGS
Date of Service: 05/31/2017    The patient is a 65-year-old woman who is here for recheck.    Diabetes.  Since I saw Angela last she saw an endocrinologist at [facility] who started her on Victoza.  Her sugars are improving and she will be following up with her in July.  Her sugars still can run at times as high as 170.  She is not having hypoglycemia.  No complaints of nausea or diarrhea.    Hypertension is controlled.  No dizziness, lightheadedness, shortness of breath or cough.  She has an occasional twinge of left axillary pain, which does not sound anginal and she had a stress echo a few years ago, which was negative.    Hyperlipidemia.  The patient is not having any myalgias from the Pravastatin.  No nausea, vomiting or abdominal pain from Fenofibrate.    PHYSICAL EXAMINATION:  GENERAL:  She is alert and comfortable.  VITAL SIGNS:  As above.  NECK:  Without adenopathy, thyromegaly or bruits.  LUNGS:  Clear.  HEART:  Regular without murmur.  ABDOMEN:  Soft, nontender, no mass or organomegaly.  EXTREMITIES:  Without edema.  No foot ulcerations.    ASSESSMENT:  1.  Hypercalcemia.   2.  Hypertension.  3.  Hyperlipidemia.  4.  Diabetes.    PLAN:  The patient will stay on the same meds. She will be seeing her endocrinologist in June to discuss her diabetic control and her high calcium and she will return in 6 months for a recheck.      Dictated By: Navid Lei MD  Signing Provider: Navid Lei MD    PB/misty ([id number])  DD: 05/31/2017 09:15:28 TD: 06/01/2017 06:37:03    Copy Sent To:    Patient's belongings returned

## 2021-11-22 NOTE — PRE-ANESTHESIA EVALUATION ADULT - LAST CARDIAC ANGIOGRAM/IMAGING
denies
FAMILY HISTORY:  Father  Still living? Unknown  Family history of prostate cancer, Age at diagnosis: Age Unknown

## 2021-11-22 NOTE — ASU PATIENT PROFILE, ADULT - NSICDXPASTMEDICALHX_GEN_ALL_CORE_FT
PAST MEDICAL HISTORY:  Asthma seasonal allergy asthma  last attack several years ago    Back pain     Dilated aortic root found on routine scan for few years, no changes since last scan   last echo within the year    GERD (gastroesophageal reflux disease)     H/O detached retina repair left eye    High cholesterol     [place] (hard of hearing)     HTN (hypertension)     Prostate cancer     Serum calcium elevated     Urge incontinence

## 2021-11-23 ENCOUNTER — NON-APPOINTMENT (OUTPATIENT)
Age: 66
End: 2021-11-23

## 2021-11-30 DIAGNOSIS — K21.9 GASTRO-ESOPHAGEAL REFLUX DISEASE WITHOUT ESOPHAGITIS: ICD-10-CM

## 2021-11-30 DIAGNOSIS — H91.90 UNSPECIFIED HEARING LOSS, UNSPECIFIED EAR: ICD-10-CM

## 2021-11-30 DIAGNOSIS — K64.8 OTHER HEMORRHOIDS: ICD-10-CM

## 2021-11-30 DIAGNOSIS — J45.909 UNSPECIFIED ASTHMA, UNCOMPLICATED: ICD-10-CM

## 2021-11-30 DIAGNOSIS — I10 ESSENTIAL (PRIMARY) HYPERTENSION: ICD-10-CM

## 2021-11-30 DIAGNOSIS — E83.52 HYPERCALCEMIA: ICD-10-CM

## 2021-11-30 DIAGNOSIS — Z85.46 PERSONAL HISTORY OF MALIGNANT NEOPLASM OF PROSTATE: ICD-10-CM

## 2021-11-30 DIAGNOSIS — M54.9 DORSALGIA, UNSPECIFIED: ICD-10-CM

## 2021-11-30 DIAGNOSIS — E78.00 PURE HYPERCHOLESTEROLEMIA, UNSPECIFIED: ICD-10-CM

## 2021-11-30 DIAGNOSIS — Z12.11 ENCOUNTER FOR SCREENING FOR MALIGNANT NEOPLASM OF COLON: ICD-10-CM

## 2021-11-30 DIAGNOSIS — Z91.048 OTHER NONMEDICINAL SUBSTANCE ALLERGY STATUS: ICD-10-CM

## 2021-11-30 DIAGNOSIS — K64.4 RESIDUAL HEMORRHOIDAL SKIN TAGS: ICD-10-CM

## 2021-11-30 DIAGNOSIS — N39.41 URGE INCONTINENCE: ICD-10-CM

## 2021-12-14 ENCOUNTER — APPOINTMENT (OUTPATIENT)
Dept: UROLOGY | Facility: CLINIC | Age: 66
End: 2021-12-14
Payer: COMMERCIAL

## 2021-12-14 DIAGNOSIS — N39.41 URGE INCONTINENCE: ICD-10-CM

## 2021-12-14 PROCEDURE — 99214 OFFICE O/P EST MOD 30 MIN: CPT

## 2021-12-14 NOTE — ADDENDUM
[FreeTextEntry1] : Patient's note was transcribed with the assistance of a medical scribe under the supervision of Dr. Meyer.\par I, Dr. Meyer, have reviewed the patient's chart and agree that it aligns with my medical decisions.\par Denisse Grover, our scribe, also served as a chaperone for physical examination purposes.\par \par \par

## 2021-12-14 NOTE — ASSESSMENT
[FreeTextEntry1] : 66M hx high risk PCa on pnbx sp RALP/ePLND (4/17/19) pathology Gl 4+3 (t5) PCa, neg margins, 0/14 LN neg, \par preop had significant BPH/LUTS. Now with BCR. Cystoscopy from 3/2021 urethra and bladder unremarkable. s/p InterStim placement.\par \par Significant improvement of overactive bladder symptoms with InterStim device.  Today he had adjustments done w the rep. \par Regarding his biochemical recurrence his PSA doubling time is less than 1 year and therefore we will continue to monitor and defer androgen deprivation therapy\par Pt is out of town for 6 months ([place]), and he will return in 6/2022 With a PSA prior.\par \par \par \par \par \par

## 2021-12-14 NOTE — HISTORY OF PRESENT ILLNESS
[FreeTextEntry1] : 66M hx high risk PCa on pnbx sp RALP/ePLND (4/17/19) pathology Gl 4+3 (t5) PCa, neg margins, 0/14 LN neg, \par preop had significant BPH/LUTS. Now with BCR. Cystoscopy from 3/2021 urethra and bladder unremarkable. s/p InterStim placement.\par \par Pt reports he is using 2-3 ppd. Occasionally damp. Pt reports he is able to hold ~ 400 ml more urine at this time. Significant improved frequency and urgency, now decreased nocturia and daytime sx. Interestingly slightly more LASHELL as he can hold more. \par \par PSA=0.24 from 12/2021 \par \par Previously: \par CT abdomen pelvis images visualized demonstrating no hydronephrosis bilaterally no evidence of metastatic disease normal kidneys bladder unremarkable\par \par Cysto with mildly narrow caliber penile urethra, very good coaptation of external sphincter. at proximal bulbar urethra, just distal to sphincter there is a white thin band of fibrosis running from 12 to 6 oclock (another small band lateral to it was opened with scope) however urethra wide open on each side of the band, scope easily inserted. \par \par Urine Dip 3/16/2021: pH: 7.0 Saqib= negative, Nitr= negative. \par 12/2021\par A1C : 5.9%  // Protein=negative // blood=negative // pH= 6 // PSA= 0.15 // creat= 1.1 // BUN=23 //\par \par PSA 0.24 12/2021\par PSA 0.20 6/2021\par PSA 0.19 3/2021\par PSA 0.15 12/2020\par PSA 0.06 8/10/2020\par PSA 0.05 3/18/19\par PSA 0.0512/5/19\par PSA 0.05 9/6/19\par Post op PSA 0.03 5/28/19\par

## 2022-02-04 NOTE — H&P PST ADULT - BACK
Patient is a 72y old  Female who presents with a chief complaint of UTI, placement (2022 09:47)      Patient seen and examined at bedside. Pt states they feel well, denies overnight events or current complaints including chest pain, shortness of breath, dizziness, nausea, vomiting, diarrhea, fever or chills.      ALLERGIES:  epinephrine (Unknown)  norepinephrine (Unknown)    MEDICATIONS  (STANDING):  ALPRAZolam 0.5 milliGRAM(s) Oral two times a day  cefTRIAXone   IVPB 1000 milliGRAM(s) IV Intermittent every 24 hours  DULoxetine 20 milliGRAM(s) Oral two times a day  enoxaparin Injectable 40 milliGRAM(s) SubCutaneous every 12 hours  gabapentin 100 milliGRAM(s) Oral three times a day  levothyroxine 50 MICROGram(s) Oral daily  pantoprazole    Tablet 40 milliGRAM(s) Oral before breakfast  polyethylene glycol 3350 17 Gram(s) Oral daily  senna 2 Tablet(s) Oral at bedtime  simvastatin 10 milliGRAM(s) Oral at bedtime    MEDICATIONS  (PRN):  melatonin 3 milliGRAM(s) Oral at bedtime PRN Insomnia  oxycodone    5 mG/acetaminophen 325 mG 1 Tablet(s) Oral every 8 hours PRN Moderate Pain (4 - 6)    Vital Signs Last 24 Hrs  T(F): 98.3 (2022 08:14), Max: 98.5 (2022 05:15)  HR: 88 (2022 08:14) (80 - 88)  BP: 126/84 (2022 08:14) (126/84 - 148/83)  RR: 18 (2022 08:14) (10 - 20)  SpO2: 92% (2022 08:14) (92% - 100%)  I&O's Summary    2022 07:01  -  2022 07:00  --------------------------------------------------------  IN: 490 mL / OUT: 1600 mL / NET: -1110 mL    2022 07:01  -  2022 11:11  --------------------------------------------------------  IN: 240 mL / OUT: 0 mL / NET: 240 mL      PHYSICAL EXAM:  General: NAD, A/O x 3  ENT: MMM, no oral thrush   Neck: Supple, No JVD  Lungs: Clear to auscultation bilaterally, non labored breathing  Cardio: RRR, S1/S2, No murmurs  Abdomen: Soft, Nontender, Nondistended; Bowel sounds present  Extremities: No calf tenderness, No pitting edema    LABS:                        12.3   11.48 )-----------( 243      ( 2022 06:30 )             38.7         143  |  108  |  29  ----------------------------<  84  4.5   |  24  |  0.98    Ca    8.9      2022 06:30    TPro  7.6  /  Alb  3.3  /  TBili  0.3  /  DBili  x   /  AST  17  /  ALT  37  /  AlkPhos  75      eGFR if Non African American: 56 mL/min/1.73M2 (22 @ 06:30)  eGFR if : 65 mL/min/1.73M2 (22 @ 06:30)                Urinalysis Basic - ( 2022 17:50 )    Color: Yellow / Appearance: Clear / S.015 / pH: x  Gluc: x / Ketone: Negative  / Bili: Negative / Urobili: Negative   Blood: x / Protein: 30 mg/dL / Nitrite: Positive   Leuk Esterase: Moderate / RBC: 0-4 /HPF / WBC 26-50 /HPF   Sq Epi: x / Non Sq Epi: Neg.-Few / Bacteria: Many /HPF        COVID-19 PCR: NotDetec (22 @ 19:30)      RADIOLOGY & ADDITIONAL TESTS:    Care Discussed with Consultants/Other Providers:   Psych NP Brady detailed exam

## 2022-05-27 NOTE — ASU DISCHARGE PLAN (ADULT/PEDIATRIC) - DATE OF LAST VACCINATION
[facility] INPATIENT ENCOUNTER   DAILY PROGRESS NOTE    ADMISSION DATE:  5/24/2022  DATE:  5/27/2022  CURRENT HOSPITAL DAY:  Hospital Day: 4  ATTENDING PHYSICIAN:  Timothy Flores MD  CODE STATUS:  Full Resuscitation      INTERVAL HISTORY:    No further n/v/abd pain. On RA, going home today.     MEDICATIONS:  The medication list was reviewed today.     HISTORIES:     I have reviewed the past medical history, family history, social history, medications and allergies listed in the medical record as obtained by my nursing staff and support staff and agree with their documentation.    OBJECTIVE:    Visit Vitals  /70 (BP Location: RUE - Right upper extremity, Patient Position: Sitting)   Pulse 88   Temp 98 °F (36.7 °C) (Temporal)   Resp 20   Ht 5' 6\" (1.676 m)   Wt (!) 141.7 kg (312 lb 4.5 oz)   SpO2 94%   BMI 50.40 kg/m²     INTAKE/OUTPUT:      Intake/Output Summary (Last 24 hours) at 5/27/2022 1001  Last data filed at 5/27/2022 0700  Gross per 24 hour   Intake 1025 ml   Output --   Net 1025 ml         PHYSICAL EXAM:    General: The patient is well developed, well nourished, in no acute distress, appears stated age.   Neurologic: Alert. Normal mood and affect, normal speech, no gross tremor.  Head: Normocephalic.  Gastrointestinal:  Soft and non distended. Non tender.  Normal bowel sounds.  No hernia.     LABORATORY DATA:    Lab Results   Component Value Date    WBC 6.9 05/27/2022    HGB 8.4 (L) 05/27/2022    HCT 29.3 (L) 05/27/2022     05/27/2022    MCV 63.3 (L) 05/27/2022   ,   Lab Results   Component Value Date    SODIUM 140 05/27/2022    POTASSIUM 3.9 05/27/2022    BUN 10 05/27/2022    CREATININE 0.66 05/27/2022    CALCIUM 8.5 05/27/2022    ALBUMIN 3.3 (L) 05/25/2022    BILIRUBIN 0.3 05/25/2022    ALKPT 58 05/25/2022    AST 26 05/25/2022    GPT 20 05/25/2022    and   Lab Results   Component Value Date    BILIRUBIN 0.3 05/25/2022    ALKPT 58 05/25/2022    AST 26 05/25/2022    GPT 20 05/25/2022       IMAGING  STUDIES:    Imaging studies reviewed.  The pertinent positives are AIR-CONTRAST UPPER GI SERIES AND ESOPHAGRAM     HISTORY: Nausea and vomiting. Epigastric pain.     An esophagram and air-contrast upper GI series was performed.     Swallowing was performed without difficulty and barium entered the  esophagus without delay.     The thoracic esophagus is patulous from the level of the thoracic inlet  through the remainder of the thoracic esophagus. This appearance is  compatible with achalasia.     There is no esophageal stricture. There are no mucosal abnormalities in the  esophagus.     There is a large hiatal hernia. There was no gastroesophageal reflux noted  at fluoroscopy.     Contrast passed freely from the hiatal hernia into the remainder of the  stomach.. There are no mucosal abnormalities in the stomach.     Contrast passed freely into the duodenum and the duodenal bulb and sweep  are unremarkable.     Note is made that the right hemidiaphragm is elevated relative to the left  hemidiaphragm. Bilateral lower lobe infiltrates are noted.     There are surgical clips in the right upper quadrant.        IMPRESSION: Patulous thoracic esophagus compatible with achalasia. No  esophageal stricture. No mucosal abnormalities in the esophagus.     Large hiatal hernia. No gastroesophageal reflux at fluoroscopy. Contrast  passed freely into the remainder of the stomach. There are no mucosal  abnormalities in the stomach or duodenum.     Bilateral lower lobe infiltrates.    ENDOSCOPY:  5/26/22 EGD: large hiatal hernia, esophagitis                           ASSESSMENT:     Nausea and vomiting   Large hiatal hernia   GERD with esophagitis     PLAN:    No further n/v. Does have int nausea and vomiting, likely due to hiatal hernia and GERD. Not likely achalasia as typically would see evidence of esophagitis with achalasia. Continue PPI BID and Carafate 4 times per day for 1 month. F/u in clinic in 1 month, will likely need  hiatal hernia repair in future.        SIGNED:  TIFFANI Arboleda  5/27/2022  10:01 AM     22-Sep-2021

## 2022-06-06 ENCOUNTER — APPOINTMENT (OUTPATIENT)
Dept: UROLOGY | Facility: CLINIC | Age: 67
End: 2022-06-06

## 2022-06-28 ENCOUNTER — APPOINTMENT (OUTPATIENT)
Dept: UROLOGY | Facility: CLINIC | Age: 67
End: 2022-06-28
Payer: SELF-PAY

## 2022-06-28 DIAGNOSIS — C61 MALIGNANT NEOPLASM OF PROSTATE: ICD-10-CM

## 2022-06-28 DIAGNOSIS — N32.81 OVERACTIVE BLADDER: ICD-10-CM

## 2022-06-28 DIAGNOSIS — R97.21 MALIGNANT NEOPLASM OF PROSTATE: ICD-10-CM

## 2022-06-28 PROCEDURE — 99214 OFFICE O/P EST MOD 30 MIN: CPT | Mod: 95

## 2022-06-28 NOTE — HISTORY OF PRESENT ILLNESS
[Home] : at home, [unfilled] , at the time of the visit. [Medical Office: ([facility])___] : at the medical office located in  [Verbal consent obtained from patient] : the patient, [unfilled] [FreeTextEntry1] : 66M hx high risk PCa on pnbx sp RALP/ePLND (4/17/19) pathology Gl 4+3 (t5) PCa, neg margins, 0/14 LN neg, \par preop had significant BPH/LUTS. Now with BCR. Cystoscopy from 3/2021 urethra and bladder unremarkable. s/p InterStim placement.\par \par Patient doing well using 2-3 pads per day which is stable however significantly improved overactive bladder symptoms with InterStim now frequency every 2-3 hours\par \par PSA 0.3 2/2022\par \par previously \par PSA=0.24 from 12/2021 \par \par CT abdomen pelvis images 5/2021 demonstrating no hydronephrosis bilaterally no evidence of metastatic disease normal kidneys bladder unremarkable\par \par Cysto with mildly narrow caliber penile urethra, very good coaptation of external sphincter. at proximal bulbar urethra, just distal to sphincter there is a white thin band of fibrosis running from 12 to 6 oclock (another small band lateral to it was opened with scope) however urethra wide open on each side of the band, scope easily inserted. \par \par Urine Dip 3/16/2021: pH: 7.0 Saqib= negative, Nitr= negative. \par 12/2021\par A1C : 5.9%  // Protein=negative // blood=negative // pH= 6 // PSA= 0.15 // creat= 1.1 // BUN=23 //\par \par PSA 0.24 12/2021\par PSA 0.20 6/2021\par PSA 0.19 3/2021\par PSA 0.15 12/2020\par PSA 0.06 8/10/2020\par PSA 0.05 3/18/19\par PSA 0.0512/5/19\par PSA 0.05 9/6/19\par Post op PSA 0.03 5/28/19\par

## 2022-06-28 NOTE — ASSESSMENT
[FreeTextEntry1] : 66M hx high risk PCa on pnbx sp RALP/ePLND (4/17/19) pathology Gl 4+3 (t5) PCa, neg margins, 0/14 LN neg, \par preop had significant BPH/LUTS. Now with BCR. Cystoscopy from 3/2021 urethra and bladder unremarkable. s/p InterStim placement.\par \par Significant improvement of overactive bladder symptoms with InterStim device.  \par Regarding his biochemical recurrence his PSA doubling time is less than 1 year and therefore we will continue to monitor and defer androgen deprivation therapy. \par However I discussed with the patient that with the availability of PSMA PET/CT I would recommend obtaining this study\par \par He will obtain the PSMA and follow-up after\par PSA planned for next week\par Patient is going to have a new urologist in [place] \par \par \par \par \par

## 2022-09-19 ENCOUNTER — APPOINTMENT (OUTPATIENT)
Dept: UROLOGY | Facility: CLINIC | Age: 67
End: 2022-09-19

## 2022-11-16 ENCOUNTER — TELEPHONE ENCOUNTER (OUTPATIENT)
Dept: URBAN - NONMETROPOLITAN AREA CLINIC 2 | Facility: CLINIC | Age: 67
End: 2022-11-16

## 2022-11-29 ENCOUNTER — CLAIMS CREATED FROM THE CLAIM WINDOW (OUTPATIENT)
Dept: URBAN - NONMETROPOLITAN AREA CLINIC 13 | Facility: CLINIC | Age: 67
End: 2022-11-29
Payer: MEDICARE

## 2022-11-29 ENCOUNTER — WEB ENCOUNTER (OUTPATIENT)
Dept: URBAN - NONMETROPOLITAN AREA CLINIC 13 | Facility: CLINIC | Age: 67
End: 2022-11-29

## 2022-11-29 ENCOUNTER — DASHBOARD ENCOUNTERS (OUTPATIENT)
Age: 67
End: 2022-11-29

## 2022-11-29 ENCOUNTER — LAB OUTSIDE AN ENCOUNTER (OUTPATIENT)
Dept: URBAN - NONMETROPOLITAN AREA CLINIC 13 | Facility: CLINIC | Age: 67
End: 2022-11-29

## 2022-11-29 VITALS
DIASTOLIC BLOOD PRESSURE: 75 MMHG | HEART RATE: 71 BPM | HEIGHT: 71 IN | WEIGHT: 198 LBS | SYSTOLIC BLOOD PRESSURE: 143 MMHG | BODY MASS INDEX: 27.72 KG/M2

## 2022-11-29 DIAGNOSIS — R93.89 ABNORMAL FINDING ON IMAGING: ICD-10-CM

## 2022-11-29 DIAGNOSIS — K22.70 BARRETT'S ESOPHAGUS DETERMINED BY BIOPSY: ICD-10-CM

## 2022-11-29 PROBLEM — 302914006: Status: ACTIVE | Noted: 2022-11-29

## 2022-11-29 PROBLEM — 408574004: Status: ACTIVE | Noted: 2022-11-29

## 2022-11-29 PROCEDURE — 99204 OFFICE O/P NEW MOD 45 MIN: CPT | Performed by: NURSE PRACTITIONER

## 2022-11-29 RX ORDER — ANTIOX #8/OM3/DHA/EPA/LUT/ZEAX 250-2.5 MG
AS DIRECTED CAPSULE ORAL
Status: ACTIVE | COMMUNITY

## 2022-11-29 RX ORDER — ALBUTEROL SULFATE 90 UG/1
1 PUFF AS NEEDED AEROSOL, METERED RESPIRATORY (INHALATION)
Status: ACTIVE | COMMUNITY

## 2022-11-29 RX ORDER — LISINOPRIL 20 MG/1
TAKE 1 TABLET BY MOUTH EVERY DAY TABLET ORAL
Qty: 90 EACH | Refills: 1 | Status: ACTIVE | COMMUNITY

## 2022-11-29 RX ORDER — GEMFIBROZIL 600 MG/1
TABLET ORAL
Qty: 180 TABLET | Status: ACTIVE | COMMUNITY

## 2022-11-29 RX ORDER — RELUGOLIX 120 MG/1
TABLET, FILM COATED ORAL
Qty: 30 TABLET | Status: ACTIVE | COMMUNITY

## 2022-11-29 RX ORDER — PANTOPRAZOLE SODIUM 40 MG/1
TABLET, DELAYED RELEASE ORAL
Qty: 90 TABLET | Status: ACTIVE | COMMUNITY

## 2022-11-29 RX ORDER — ALBUTEROL SULFATE 90 UG/1
AEROSOL, METERED RESPIRATORY (INHALATION)
Qty: 18 GRAM | Status: ACTIVE | COMMUNITY

## 2022-11-29 RX ORDER — FLUTICASONE PROPIONATE AND SALMETEROL 50; 250 UG/1; UG/1
USE1 PUFF BY MOUTH AS DIRECTED TWICE A DAY POWDER RESPIRATORY (INHALATION)
Qty: 60 EACH | Refills: 2 | Status: ACTIVE | COMMUNITY

## 2022-11-29 NOTE — HPI-TODAY'S VISIT:
11/29/2022 Mr. Jeramy Harrison is a 67 year old male referred by Dr Gamino for possible EUS. He has reflux and difficult swallowing. He has been seeing Dr Gamino. He had an EGD with non-active gastritis and Barretts esophagus. He is on protonix. He also has prostate cancer and recently completed radiation with Dr Riggins. He had a PET scan that showed abnormal activity in the FLY distal esophagus.  he denies any changes in his weight or appetite. CS

## 2022-12-09 ENCOUNTER — OFFICE VISIT (OUTPATIENT)
Dept: URBAN - METROPOLITAN AREA MEDICAL CENTER 1 | Facility: MEDICAL CENTER | Age: 67
End: 2022-12-09
Payer: MEDICARE

## 2022-12-09 DIAGNOSIS — K22.70 BARRETT ESOPHAGUS: ICD-10-CM

## 2022-12-09 PROCEDURE — 43237 ENDOSCOPIC US EXAM ESOPH: CPT | Performed by: INTERNAL MEDICINE

## 2022-12-13 ENCOUNTER — TELEPHONE ENCOUNTER (OUTPATIENT)
Dept: URBAN - NONMETROPOLITAN AREA CLINIC 2 | Facility: CLINIC | Age: 67
End: 2022-12-13

## 2022-12-14 ENCOUNTER — LAB OUTSIDE AN ENCOUNTER (OUTPATIENT)
Dept: URBAN - NONMETROPOLITAN AREA CLINIC 2 | Facility: CLINIC | Age: 67
End: 2022-12-14

## 2023-03-07 ENCOUNTER — OFFICE VISIT (OUTPATIENT)
Dept: URBAN - NONMETROPOLITAN AREA CLINIC 13 | Facility: CLINIC | Age: 68
End: 2023-03-07

## 2023-03-16 ENCOUNTER — OFFICE VISIT (OUTPATIENT)
Dept: URBAN - NONMETROPOLITAN AREA CLINIC 13 | Facility: CLINIC | Age: 68
End: 2023-03-16

## 2023-03-17 NOTE — ASU DISCHARGE PLAN (ADULT/PEDIATRIC) - CALL YOUR DOCTOR IF YOU HAVE ANY OF THE FOLLOWING:
"  Problem: Plan of Care - These are the overarching goals to be used throughout the patient stay.    Goal: Plan of Care Review  Description: The Plan of Care Review/Shift note should be completed every shift.  The Outcome Evaluation is a brief statement about your assessment that the patient is improving, declining, or no change.  This information will be displayed automatically on your shift note.  Outcome: Met  Goal: Patient-Specific Goal (Individualized)  Description: You can add care plan individualizations to a care plan. Examples of Individualization might be:  \"Parent requests to be called daily at 9am for status\", \"I have a hard time hearing out of my right ear\", or \"Do not touch me to wake me up as it startles me\".  Outcome: Met  Goal: Absence of Hospital-Acquired Illness or Injury  Outcome: Met  Goal: Optimal Comfort and Wellbeing  Outcome: Met  Goal: Readiness for Transition of Care  Outcome: Met   Goal Outcome Evaluation:                        " Bleeding that does not stop/Swelling that gets worse/Pain not relieved by Medications/Fever greater than (need to indicate Fahrenheit or Celsius)/Wound/Surgical Site with redness, or foul smelling discharge or pus/Nausea and vomiting that does not stop/Unable to urinate/Inability to tolerate liquids or foods

## 2024-02-22 NOTE — PHYSICAL EXAM GASTROINTESTINAL
Abdomen , soft, nontender, nondistended , no guarding or rigidity , no masses palpable , normal bowel sounds , Liver and Spleen , no hepatosplenomegaly  Rectal deferred Medications
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
